# Patient Record
Sex: FEMALE | Race: BLACK OR AFRICAN AMERICAN | Employment: OTHER | URBAN - METROPOLITAN AREA
[De-identification: names, ages, dates, MRNs, and addresses within clinical notes are randomized per-mention and may not be internally consistent; named-entity substitution may affect disease eponyms.]

---

## 2017-09-26 ENCOUNTER — OFFICE VISIT (OUTPATIENT)
Dept: FAMILY MEDICINE CLINIC | Facility: CLINIC | Age: 82
End: 2017-09-26

## 2017-09-26 VITALS
WEIGHT: 173 LBS | OXYGEN SATURATION: 98 % | BODY MASS INDEX: 37.32 KG/M2 | HEIGHT: 57 IN | RESPIRATION RATE: 16 BRPM | HEART RATE: 56 BPM | SYSTOLIC BLOOD PRESSURE: 132 MMHG | DIASTOLIC BLOOD PRESSURE: 50 MMHG | TEMPERATURE: 97.5 F

## 2017-09-26 DIAGNOSIS — I10 HTN, GOAL BELOW 150/90: Primary | ICD-10-CM

## 2017-09-26 DIAGNOSIS — F03.90 DEMENTIA WITHOUT BEHAVIORAL DISTURBANCE, UNSPECIFIED DEMENTIA TYPE: ICD-10-CM

## 2017-09-26 DIAGNOSIS — H25.9 AGE-RELATED CATARACT, UNSPECIFIED AGE-RELATED CATARACT TYPE, UNSPECIFIED LATERALITY: ICD-10-CM

## 2017-09-26 DIAGNOSIS — L60.3 DYSTROPHIC NAIL: ICD-10-CM

## 2017-09-26 DIAGNOSIS — Z23 ENCOUNTER FOR IMMUNIZATION: ICD-10-CM

## 2017-09-26 DIAGNOSIS — H91.93 BILATERAL HEARING LOSS, UNSPECIFIED HEARING LOSS TYPE: ICD-10-CM

## 2017-09-26 RX ORDER — VALSARTAN AND HYDROCHLOROTHIAZIDE 160; 25 MG/1; MG/1
1 TABLET ORAL DAILY
Qty: 90 TAB | Refills: 3 | Status: SHIPPED | OUTPATIENT
Start: 2017-09-26 | End: 2018-08-28 | Stop reason: ALTCHOICE

## 2017-09-26 RX ORDER — VALSARTAN 160 MG/1
160 TABLET ORAL DAILY
COMMUNITY
Start: 2016-08-01 | End: 2017-09-26 | Stop reason: ALTCHOICE

## 2017-09-26 RX ORDER — PREDNISOLONE ACETATE 10 MG/ML
SUSPENSION/ DROPS OPHTHALMIC DAILY
COMMUNITY
End: 2018-02-16

## 2017-09-26 RX ORDER — HYDROCHLOROTHIAZIDE 25 MG/1
25 TABLET ORAL DAILY
COMMUNITY
End: 2017-09-26 | Stop reason: ALTCHOICE

## 2017-09-26 NOTE — PATIENT INSTRUCTIONS
Vaccine Information Statement    Influenza (Flu) Vaccine (Inactivated or Recombinant): What you need to know    Many Vaccine Information Statements are available in Kyrgyz and other languages. See www.immunize.org/vis  Hojas de Información Sobre Vacunas están disponibles en Español y en muchos otros idiomas. Visite www.immunize.org/vis    1. Why get vaccinated? Influenza (flu) is a contagious disease that spreads around the [place] every year, usually between October and May. Flu is caused by influenza viruses, and is spread mainly by coughing, sneezing, and close contact. Anyone can get flu. Flu strikes suddenly and can last several days. Symptoms vary by age, but can include:   fever/chills   sore throat   muscle aches   fatigue   cough   headache    runny or stuffy nose    Flu can also lead to pneumonia and blood infections, and cause diarrhea and seizures in children. If you have a medical condition, such as heart or lung disease, flu can make it worse. Flu is more dangerous for some people. Infants and young children, people 72years of age and older, pregnant women, and people with certain health conditions or a weakened immune system are at greatest risk. Each year thousands of people in the [facility] die from flu, and many more are hospitalized. Flu vaccine can:   keep you from getting flu,   make flu less severe if you do get it, and   keep you from spreading flu to your family and other people. 2. Inactivated and recombinant flu vaccines    A dose of flu vaccine is recommended every flu season. Children 6 months through 6years of age may need two doses during the same flu season. Everyone else needs only one dose each flu season.        Some inactivated flu vaccines contain a very small amount of a mercury-based preservative called thimerosal. Studies have not shown thimerosal in vaccines to be harmful, but flu vaccines that do not contain thimerosal are available. There is no live flu virus in flu shots. They cannot cause the flu. There are many flu viruses, and they are always changing. Each year a new flu vaccine is made to protect against three or four viruses that are likely to cause disease in the upcoming flu season. But even when the vaccine doesnt exactly match these viruses, it may still provide some protection    Flu vaccine cannot prevent:   flu that is caused by a virus not covered by the vaccine, or   illnesses that look like flu but are not. It takes about 2 weeks for protection to develop after vaccination, and protection lasts through the flu season. 3. Some people should not get this vaccine    Tell the person who is giving you the vaccine:     If you have any severe, life-threatening allergies. If you ever had a life-threatening allergic reaction after a dose of flu vaccine, or have a severe allergy to any part of this vaccine, you may be advised not to get vaccinated. Most, but not all, types of flu vaccine contain a small amount of egg protein.  If you ever had Guillain-Barré Syndrome (also called GBS). Some people with a history of GBS should not get this vaccine. This should be discussed with your doctor.  If you are not feeling well. It is usually okay to get flu vaccine when you have a mild illness, but you might be asked to come back when you feel better. 4. Risks of a vaccine reaction    With any medicine, including vaccines, there is a chance of reactions. These are usually mild and go away on their own, but serious reactions are also possible. Most people who get a flu shot do not have any problems with it.      Minor problems following a flu shot include:    soreness, redness, or swelling where the shot was given     hoarseness   sore, red or itchy eyes   cough   fever   aches   headache   itching   fatigue  If these problems occur, they usually begin soon after the shot and last 1 or 2 days. More serious problems following a flu shot can include the following:     There may be a small increased risk of Guillain-Barré Syndrome (GBS) after inactivated flu vaccine. This risk has been estimated at 1 or 2 additional cases per million people vaccinated. This is much lower than the risk of severe complications from flu, which can be prevented by flu vaccine.  Young children who get the flu shot along with pneumococcal vaccine (PCV13) and/or DTaP vaccine at the same time might be slightly more likely to have a seizure caused by fever. Ask your doctor for more information. Tell your doctor if a child who is getting flu vaccine has ever had a seizure. Problems that could happen after any injected vaccine:      People sometimes faint after a medical procedure, including vaccination. Sitting or lying down for about 15 minutes can help prevent fainting, and injuries caused by a fall. Tell your doctor if you feel dizzy, or have vision changes or ringing in the ears.  Some people get severe pain in the shoulder and have difficulty moving the arm where a shot was given. This happens very rarely.  Any medication can cause a severe allergic reaction. Such reactions from a vaccine are very rare, estimated at about 1 in a million doses, and would happen within a few minutes to a few hours after the vaccination. As with any medicine, there is a very remote chance of a vaccine causing a serious injury or death. The safety of vaccines is always being monitored. For more information, visit: www.cdc.gov/vaccinesafety/    5. What if there is a serious reaction? What should I look for?  Look for anything that concerns you, such as signs of a severe allergic reaction, very high fever, or unusual behavior.     Signs of a severe allergic reaction can include hives, swelling of the face and throat, difficulty breathing, a fast heartbeat, dizziness, and weakness  usually within a few minutes to a few hours after the vaccination. What should I do?  If you think it is a severe allergic reaction or other emergency that cant wait, call 9-1-1 and get the person to the nearest hospital. Otherwise, call your doctor.  Reactions should be reported to the Vaccine Adverse Event Reporting System (VAERS). Your doctor should file this report, or you can do it yourself through  the VAERS web site at www.vaers. [facility].gov, or by calling 0-615.411.8102. VAERS does not give medical advice. 6. The National Vaccine Injury Compensation Program    The [facility] Vaccine Injury Compensation Program (VICP) is a federal program that was created to compensate people who may have been injured by certain vaccines. Persons who believe they may have been injured by a vaccine can learn about the program and about filing a claim by calling 0-926.478.4610 or visiting the [employer] website at www.[facility].gov/vaccinecompensation. There is a time limit to file a claim for compensation. 7. How can I learn more?  Ask your healthcare provider. He or she can give you the vaccine package insert or suggest other sources of information.  Call your local or state health department.  Contact the Centers for Disease Control and Prevention (CDC):  - Call 9-248.884.3919 (1-800-CDC-INFO) or  - Visit CDCs website at www.cdc.gov/flu    Vaccine Information Statement   Inactivated Influenza Vaccine   8/7/2015  42 RADHA Negrete 488VJ-05    Department of Health and Human Services  Centers for Disease Control and Prevention    Office Use Only

## 2017-09-26 NOTE — PROGRESS NOTES
HISTORY OF PRESENT ILLNESS  Brody Pedersen is a 80 y.o. female. HPI Comments: Presents to establish care for HTN, hyperlipidemia. No problems with medications. She hasn't had a Wellness visit, but did have at least one PCV shot. She is is in need of a local podiatrist (ney) and ophthalmologist (has moved to [place] from [place] lately). Her daughter has medical power of . She has been diagnosed with dementia, and has been tried on several medications for this without improvement - no behavioral issues. She had left cataract surgery about a year ago, and is still on steroid drops due to inflammation. She will get a flu shot today. Establish Care   Pertinent negatives include no chest pain, no headaches and no shortness of breath. Hypertension    Pertinent negatives include no chest pain, no palpitations, no blurred vision, no headaches, no neck pain, no dizziness, no shortness of breath, no nausea and no vomiting. Past Medical History:   Diagnosis Date    Hypercholesterolemia     Hypertension        Past Surgical History:   Procedure Laterality Date    HX CATARACT REMOVAL Left     HX  SECTION         History   Smoking Status    Never Smoker   Smokeless Tobacco    Never Used     Current Outpatient Prescriptions   Medication Sig    valsartan (DIOVAN) 160 mg tablet Take 160 mg by mouth daily.  hydroCHLOROthiazide (HYDRODIURIL) 25 mg tablet Take 25 mg by mouth daily. Indications: take one-half tablet by mouth oncedily    prednisoLONE acetate (PRED FORTE) 1 % ophthalmic suspension Administer  to left eye daily. No current facility-administered medications for this visit. Review of Systems   Constitutional: Negative for chills, fever and weight loss. HENT: Positive for hearing loss. Negative for congestion and sore throat. Eyes: Negative for blurred vision and double vision. Respiratory: Negative for shortness of breath. Cough: sometimes. Cardiovascular: Negative for chest pain, palpitations and leg swelling. Gastrointestinal: Negative for heartburn, nausea and vomiting. Genitourinary: Negative for dysuria, frequency and urgency. Musculoskeletal: Negative for back pain and neck pain. Skin: Positive for itching (scalp, with some tenderness). Negative for rash. Neurological: Negative for dizziness, tingling, focal weakness and headaches. Endo/Heme/Allergies: Negative for environmental allergies. Psychiatric/Behavioral: Positive for memory loss. Negative for depression. The patient is not nervous/anxious and does not have insomnia. Visit Vitals    /50    Pulse (!) 56    Temp 97.5 °F (36.4 °C)    Resp 16    Ht 4' 8.5\" (1.435 m)    Wt 173 lb (78.5 kg)    SpO2 98%    BMI 38.1 kg/m2       Physical Exam   Constitutional: She appears well-developed and well-nourished. No distress. HENT:   Right Ear: Tympanic membrane, external ear and ear canal normal.   Left Ear: Tympanic membrane, external ear and ear canal normal.   Nose: Nose normal.   Mouth/Throat: Oropharynx is clear and moist.   Eyes: Conjunctivae and EOM are normal. Pupils are equal, round, and reactive to light. Neck: Neck supple. Carotid bruit is not present. No thyromegaly present. Cardiovascular: Normal rate, regular rhythm and intact distal pulses. Exam reveals no gallop and no friction rub. No murmur heard. Pulmonary/Chest: Effort normal and breath sounds normal. No respiratory distress. Abdominal: Soft. She exhibits no mass. There is no tenderness. Musculoskeletal: She exhibits edema (1+ to knees). Lymphadenopathy:     She has no cervical adenopathy. Neurological: She is alert. No cranial nerve deficit. Skin: Skin is warm and dry. Psychiatric: She has a normal mood and affect. Her behavior is normal. Judgment and thought content normal.       ASSESSMENT and PLAN    ICD-10-CM ICD-9-CM    1.  HTN, goal below 150/90 I10 401.9 valsartan-hydroCHLOROthiazide (DIOVAN-HCT) 160-25 mg per tablet   2. Dystrophic nail L60.3 703.8 REFERRAL TO PODIATRY   3. Bilateral hearing loss, unspecified hearing loss type H91.93 389.9    4. Dementia without behavioral disturbance, unspecified dementia type F03.90 294.20    5. Age-related cataract, unspecified age-related cataract type, unspecified laterality H25.9 366.10 REFERRAL TO OPHTHALMOLOGY   6. Encounter for immunization Z23 V03.89 INFLUENZA VIRUS VACCINE, HIGH DOSE SEASONAL, PRESERVATIVE FREE      ADMIN INFLUENZA VIRUS VAC     Follow-up Disposition:  Return in about 3 months (around 12/26/2017). the following changes in treatment are made: Will combine her medication into one tablet. Referrals to Ophth and Podiatry. Will request records from her previous doctors before proceeding with a wellness visit (especially regarding immunizations). Flu shot today. reviewed medications and side effects in detail  Daughter encouraged to bring in her advance directive. Plan of care reviewed - patient and daughter verbalize(s) understanding and agreement.

## 2018-01-19 ENCOUNTER — OFFICE VISIT (OUTPATIENT)
Dept: FAMILY MEDICINE CLINIC | Facility: CLINIC | Age: 83
End: 2018-01-19

## 2018-01-19 VITALS
HEIGHT: 57 IN | TEMPERATURE: 97.4 F | HEART RATE: 56 BPM | WEIGHT: 169 LBS | DIASTOLIC BLOOD PRESSURE: 71 MMHG | OXYGEN SATURATION: 98 % | SYSTOLIC BLOOD PRESSURE: 178 MMHG | BODY MASS INDEX: 36.46 KG/M2 | RESPIRATION RATE: 12 BRPM

## 2018-01-19 DIAGNOSIS — F02.818 LATE ONSET ALZHEIMER'S DISEASE WITH BEHAVIORAL DISTURBANCE (HCC): Primary | ICD-10-CM

## 2018-01-19 DIAGNOSIS — R41.0 CONFUSION: ICD-10-CM

## 2018-01-19 DIAGNOSIS — G30.1 LATE ONSET ALZHEIMER'S DISEASE WITH BEHAVIORAL DISTURBANCE (HCC): Primary | ICD-10-CM

## 2018-01-19 LAB
BILIRUB UR QL STRIP: NEGATIVE
GLUCOSE UR-MCNC: NEGATIVE MG/DL
KETONES P FAST UR STRIP-MCNC: NEGATIVE MG/DL
PH UR STRIP: 5.5 [PH] (ref 4.6–8)
PROT UR QL STRIP: NEGATIVE
SP GR UR STRIP: 1.01 (ref 1–1.03)
UA UROBILINOGEN AMB POC: NORMAL (ref 0.2–1)
URINALYSIS CLARITY POC: CLEAR
URINALYSIS COLOR POC: YELLOW
URINE BLOOD POC: NEGATIVE
URINE LEUKOCYTES POC: NORMAL
URINE NITRITES POC: NEGATIVE

## 2018-01-19 NOTE — PROGRESS NOTES
HISTORY OF PRESENT ILLNESS  Jacek Weathers is a 80 y.o. female. HPI Comments: Presents with concerns about increasing difficulty in managing her care at home. Her family is having trouble getting her to take medications regularly, allow for personal hygiene assistance. She is more belligerent in these situations than before. No violence, fever, incontinence noted. The family wondered if a bladder infection could contribute to this (but no particular symptoms). Bladder Infection    Pertinent negatives include no chills, no nausea, no vomiting and no frequency. Review of Systems   Constitutional: Negative for chills and fever. Respiratory: Negative for shortness of breath. Cardiovascular: Negative for chest pain. Gastrointestinal: Negative for nausea and vomiting. Genitourinary: Negative for dysuria and frequency. Psychiatric/Behavioral: Positive for memory loss. Negative for hallucinations. Some delusions noted       Physical Exam   Constitutional: She appears well-developed and well-nourished. No distress. Neck: Neck supple. No thyromegaly present. Cardiovascular: Normal rate and regular rhythm. Exam reveals no gallop and no friction rub. No murmur heard. Pulmonary/Chest: Effort normal and breath sounds normal. No respiratory distress. Lymphadenopathy:     She has no cervical adenopathy. Neurological: She is alert. Skin: Skin is warm and dry. Psychiatric: She has a normal mood and affect. Her behavior is normal. Judgment and thought content normal.       ASSESSMENT and PLAN    ICD-10-CM ICD-9-CM    1. Late onset Alzheimer's disease with behavioral disturbance G30.1 331.0 REFERRAL TO HOME HEALTH    F02.81 294.11    2. Confusion R41.0 298.9 AMB POC URINALYSIS DIP STICK AUTO W/O MICRO     Follow-up Disposition:  Return in about 4 weeks (around 2/16/2018).   the following changes in treatment are made: Referral to [address] [place] [address] Juan Francisco Felton regarding level of services and availability of services  lab results and schedule of future lab studies reviewed with patient  Will scan her Medical Power of  into the record. Plan of care reviewed - daughter verbalize(s) understanding and agreement.

## 2018-01-19 NOTE — MR AVS SNAPSHOT
[facility] 
 
 
 [address] [address] 
[phone] Patient: Uri Mosley MRN: [id number] FJN:[birth year] Visit Information Date & Time Provider Department Dept. Phone Encounter #  
 1/19/2018 11:30 AM Kevyn Perez MD [employer] [phone] [id number] Follow-up Instructions Return in about 4 weeks (around 2/16/2018). Upcoming Health Maintenance Date Due DTaP/Tdap/Td series (1 - Tdap) 11/5/1944 ZOSTER VACCINE AGE 60> 9/5/1983 OSTEOPOROSIS SCREENING (DEXA) 11/5/1988 Pneumococcal 65+ Low/Medium Risk (1 of 2 - PCV13) 11/5/1988 MEDICARE YEARLY EXAM 11/5/1988 GLAUCOMA SCREENING Q2Y 12/6/2019 Allergies as of 1/19/2018  Review Complete On: 1/19/2018 By: Kevyn Perez MD  
  
 Severity Noted Reaction Type Reactions Pcn [Penicillins]  09/26/2017    Unable to Obtain Current Immunizations  Never Reviewed Name Date Influenza High Dose Vaccine PF 9/26/2017 Not reviewed this visit You Were Diagnosed With   
  
 Codes Comments Late onset Alzheimer's disease with behavioral disturbance    -  Primary ICD-10-CM: G30.1, F02.81 ICD-9-CM: 331.0, 294.11 Confusion     ICD-10-CM: R41.0 ICD-9-CM: 298.9 Vitals BP Pulse Temp Resp Height(growth percentile) Weight(growth percentile) 178/71 (!) 56 97.4 °F (36.3 °C) 12 4' 8.5\" (1.435 m) 169 lb (76.7 kg) SpO2 BMI OB Status Smoking Status 98% 37.22 kg/m2 Postmenopausal Never Smoker Vitals History BMI and BSA Data Body Mass Index Body Surface Area  
 37.22 kg/m 2 1.75 m 2 Preferred Pharmacy Pharmacy Name Phone [address] [phone] Your Updated Medication List  
  
   
This list is accurate as of: 1/19/18 12:34 PM.  Always use your most recent med list.  
  
  
  
  
 prednisoLONE acetate 1 % ophthalmic suspension Commonly known as:  PRED FORTE Administer  to left eye daily. valsartan-hydroCHLOROthiazide 160-25 mg per tablet Commonly known as:  DIOVAN-HCT Take 1 Tab by mouth daily. Indications: hypertension We Performed the Following AMB POC URINALYSIS DIP STICK AUTO W/O MICRO [25655 CPT(R)] [address] [place] [address] Comments:  
 [phone] Please do home health evaluation for dementia - level of services required. She is home-bound (significantly difficult to get her to leave her home). Follow-up Instructions Return in about 4 weeks (around 2/16/2018). Referral Information Referral ID Referred By Referred To  
  
 [id number] Juve OLSON Not Available Visits Status Start Date End Date 1 New Request 1/19/18 1/19/19 If your referral has a status of pending review or denied, additional information will be sent to support the outcome of this decision. Introducing [facility] & HEALTH SERVICES! Rodrigo Patel introduces [employer] patient portal. Now you can access parts of your medical record, email your doctor's office, and request medication refills online. 1. In your internet browser, go to [email or web address] 2. Click on the First Time User? Click Here link in the Sign In box. You will see the New Member Sign Up page. 3. Enter your [employer] Access Code exactly as it appears below. You will not need to use this code after youve completed the sign-up process. If you do not sign up before the expiration date, you must request a new code. · [employer] Access Code: [email or web address] Expires: 4/19/2018 12:34 PM 
 
4. Enter the last four digits of your Social Security Number (xxxx) and Date of Birth (mm/dd/yyyy) as indicated and click Submit. You will be taken to the next sign-up page. 5. Create a Pylba ID.  This will be your Pylba login ID and cannot be changed, so think of one that is secure and easy to remember. 6. Create a [employer] password. You can change your password at any time. 7. Enter your Password Reset Question and Answer. This can be used at a later time if you forget your password. 8. Enter your e-mail address. You will receive e-mail notification when new information is available in [address] 9. Click Sign Up. You can now view and download portions of your medical record. 10. Click the Download Summary menu link to download a portable copy of your medical information. If you have questions, please visit the Frequently Asked Questions section of the [employer] website. Remember, [employer] is NOT to be used for urgent needs. For medical emergencies, dial 911. Now available from your iPhone and Android! Please provide this summary of care documentation to your next provider. Your primary care clinician is listed as Faraz Fam. If you have any questions after today's visit, please call [phone].

## 2018-02-16 ENCOUNTER — OFFICE VISIT (OUTPATIENT)
Dept: FAMILY MEDICINE CLINIC | Facility: CLINIC | Age: 83
End: 2018-02-16

## 2018-02-16 VITALS
HEART RATE: 55 BPM | RESPIRATION RATE: 18 BRPM | OXYGEN SATURATION: 98 % | TEMPERATURE: 97.9 F | BODY MASS INDEX: 37.07 KG/M2 | WEIGHT: 164.8 LBS | SYSTOLIC BLOOD PRESSURE: 137 MMHG | HEIGHT: 56 IN | DIASTOLIC BLOOD PRESSURE: 55 MMHG

## 2018-02-16 DIAGNOSIS — I10 HTN, GOAL BELOW 150/90: Primary | ICD-10-CM

## 2018-02-16 DIAGNOSIS — F02.818 LATE ONSET ALZHEIMER'S DISEASE WITH BEHAVIORAL DISTURBANCE (HCC): ICD-10-CM

## 2018-02-16 DIAGNOSIS — Z22.7 TB LUNG, LATENT: ICD-10-CM

## 2018-02-16 DIAGNOSIS — G30.1 LATE ONSET ALZHEIMER'S DISEASE WITH BEHAVIORAL DISTURBANCE (HCC): ICD-10-CM

## 2018-02-16 DIAGNOSIS — E78.5 HYPERLIPIDEMIA, UNSPECIFIED HYPERLIPIDEMIA TYPE: ICD-10-CM

## 2018-02-16 RX ORDER — KETOROLAC TROMETHAMINE 4 MG/ML
1 SOLUTION/ DROPS OPHTHALMIC 4 TIMES DAILY
COMMUNITY
End: 2018-04-25 | Stop reason: SDDI

## 2018-02-16 NOTE — PROGRESS NOTES
Abel Dominguez is a  80 y.o. female presents today for office visit for routine follow up. 1. Have you been to the ER, urgent care clinic or hospitalized since your last visit? NO    2. Have you seen or consulted any other health care providers outside of the [address] [place] [address] since your last visit (Include any pap smears or colon screening)?  NO

## 2018-02-16 NOTE — MR AVS SNAPSHOT
[address] [address] 
[phone] Patient: Uri Mosley MRN: [id number] OPC:[birth year] Visit Information Date & Time Provider Department Dept. Phone Encounter #  
 2/16/2018  1:30 PM Kevyn Perez MD [facility] [phone] [id number] Follow-up Instructions Return in about 3 months (around 5/16/2018). Upcoming Health Maintenance Date Due DTaP/Tdap/Td series (1 - Tdap) 11/5/1944 ZOSTER VACCINE AGE 60> 9/5/1983 OSTEOPOROSIS SCREENING (DEXA) 11/5/1988 Pneumococcal 65+ Low/Medium Risk (1 of 2 - PCV13) 11/5/1988 MEDICARE YEARLY EXAM 11/5/1988 GLAUCOMA SCREENING Q2Y 12/6/2019 Allergies as of 2/16/2018  Review Complete On: 2/16/2018 By: Kevyn Perez MD  
  
 Severity Noted Reaction Type Reactions Aspirin  07/26/2016    Itching Pcn [Penicillins]  09/26/2017    Unable to Obtain Current Immunizations  Never Reviewed Name Date Influenza High Dose Vaccine PF 9/26/2017 Not reviewed this visit You Were Diagnosed With   
  
 Codes Comments HTN, goal below 150/90    -  Primary ICD-10-CM: I10 
ICD-9-CM: 401.9 Hyperlipidemia, unspecified hyperlipidemia type     ICD-10-CM: E78.5 ICD-9-CM: 272.4 Late onset Alzheimer's disease with behavioral disturbance     ICD-10-CM: G30.1, F02.81 ICD-9-CM: 331.0, 294.11   
 TB lung, latent     ICD-10-CM: R76.11 
ICD-9-CM: 795.51 Vitals BP Pulse Temp Resp Height(growth percentile) Weight(growth percentile) 137/55 (BP 1 Location: Right arm, BP Patient Position: Sitting) (!) 55 97.9 °F (36.6 °C) (Oral) 18 4' 8\" (1.422 m) 164 lb 12.8 oz (74.8 kg) SpO2 BMI OB Status Smoking Status 98% 36.95 kg/m2 Postmenopausal Never Smoker Vitals History BMI and BSA Data Body Mass Index Body Surface Area  
 36.95 kg/m 2 1.72 m 2 Preferred Pharmacy Pharmacy Name Phone [address], [address] [place] [address] [phone] Your Updated Medication List  
  
   
This list is accurate as of: 2/16/18  2:24 PM.  Always use your most recent med list.  
  
  
  
  
 ketorolac 0.4 % Drop Commonly known as:  ACULAR LS Administer 1 Drop to both eyes four (4) times daily. valsartan-hydroCHLOROthiazide 160-25 mg per tablet Commonly known as:  DIOVAN-HCT Take 1 Tab by mouth daily. Indications: hypertension Follow-up Instructions Return in about 3 months (around 5/16/2018). Introducing \[facility]\"" & HEALTH SERVICES! Armando Cheng introduces [employer] patient portal. Now you can access parts of your medical record, email your doctor's office, and request medication refills online. 1. In your internet browser, go to [email or web address] 2. Click on the First Time User? Click Here link in the Sign In box. You will see the New Member Sign Up page. 3. Enter your [employer] Access Code exactly as it appears below. You will not need to use this code after youve completed the sign-up process. If you do not sign up before the expiration date, you must request a new code. · [employer] Access Code: [email or web address] Expires: 4/19/2018 12:34 PM 
 
4. Enter the last four digits of your Social Security Number (xxxx) and Date of Birth (mm/dd/yyyy) as indicated and click Submit. You will be taken to the next sign-up page. 5. Create a [employer] ID. This will be your [employer] login ID and cannot be changed, so think of one that is secure and easy to remember. 6. Create a [employer] password. You can change your password at any time. 7. Enter your Password Reset Question and Answer. This can be used at a later time if you forget your password. 8. Enter your e-mail address. You will receive e-mail notification when new information is available in [address] 9. Click Sign Up. You can now view and download portions of your medical record. 10. Click the Download Summary menu link to download a portable copy of your medical information. If you have questions, please visit the Frequently Asked Questions section of the [employer] website. Remember, [employer] is NOT to be used for urgent needs. For medical emergencies, dial 911. Now available from your iPhone and Android! Please provide this summary of care documentation to your next provider. Your primary care clinician is listed as Melia Munoz. If you have any questions after today's visit, please call [phone].

## 2018-02-16 NOTE — PROGRESS NOTES
HISTORY OF PRESENT ILLNESS  Uri Mosley is a 80 y.o. female. HPI Comments: Seen in follow-up for HTN, hyperlipidemia, dementia. No problems with medications. Her daughter never heard anything from [facility]. No real changes in her situation (see my last note). Her daughter asks about the procedure to be followed to get her a CXR (previous positive TB testing) when the time comes. Dementia    Pertinent negatives include no tingling. Past Medical History:   Diagnosis Date    Hypercholesterolemia     Hypertension        Past Surgical History:   Procedure Laterality Date    HX CATARACT REMOVAL Left     HX  SECTION         History   Smoking Status    Never Smoker   Smokeless Tobacco    Never Used     . medte    Review of Systems   Constitutional: Negative for chills and fever. Respiratory: Negative for shortness of breath. Cardiovascular: Negative for chest pain, palpitations and leg swelling. Gastrointestinal: Negative for nausea and vomiting. Neurological: Negative for tingling, focal weakness and headaches. Psychiatric/Behavioral: The patient does not have insomnia. Visit Vitals    /55 (BP 1 Location: Right arm, BP Patient Position: Sitting)    Pulse (!) 55    Temp 97.9 °F (36.6 °C) (Oral)    Resp 18    Ht 4' 8\" (1.422 m)    Wt 164 lb 12.8 oz (74.8 kg)    SpO2 98%    BMI 36.95 kg/m2       Physical Exam   Constitutional: She is oriented to person, place, and time. She appears well-developed and well-nourished. No distress. Neck: Neck supple. No thyromegaly present. Carotid bruit absent   Cardiovascular: Normal rate, regular rhythm and intact distal pulses. Exam reveals no gallop and no friction rub. No murmur heard. Pulmonary/Chest: Effort normal and breath sounds normal. No respiratory distress. Musculoskeletal: She exhibits no edema. Lymphadenopathy:     She has no cervical adenopathy.    Neurological: She is alert and oriented to person, place, and time. Skin: Skin is warm and dry. Psychiatric: She has a normal mood and affect. Her behavior is normal. Judgment and thought content normal.   Nursing note and vitals reviewed. ASSESSMENT and PLAN    ICD-10-CM ICD-9-CM    1. HTN, goal below 150/90 I10 401.9    2. Hyperlipidemia, unspecified hyperlipidemia type E78.5 272.4    3. Late onset Alzheimer's disease with behavioral disturbance G30.1 331.0     F02.81 294.11    4. TB lung, latent R76.11 795.51      Follow-up Disposition:  Return in about 3 months (around 5/16/2018). current treatment plan is effective, no change in therapy  Will investigate why Home Health has not made any connection with this family. Plan of care reviewed - patient verbalize(s) understanding and agreement.

## 2018-04-25 ENCOUNTER — HOME HEALTH ADMISSION (OUTPATIENT)
Dept: HOME HEALTH SERVICES | Facility: HOME HEALTH | Age: 83
End: 2018-04-25
Payer: MEDICARE

## 2018-04-25 ENCOUNTER — OFFICE VISIT (OUTPATIENT)
Dept: FAMILY MEDICINE CLINIC | Facility: CLINIC | Age: 83
End: 2018-04-25

## 2018-04-25 VITALS
HEIGHT: 56 IN | RESPIRATION RATE: 14 BRPM | DIASTOLIC BLOOD PRESSURE: 53 MMHG | SYSTOLIC BLOOD PRESSURE: 125 MMHG | HEART RATE: 60 BPM | WEIGHT: 168 LBS | TEMPERATURE: 98 F | OXYGEN SATURATION: 99 % | BODY MASS INDEX: 37.79 KG/M2

## 2018-04-25 DIAGNOSIS — F02.818 LATE ONSET ALZHEIMER'S DISEASE WITH BEHAVIORAL DISTURBANCE (HCC): Primary | ICD-10-CM

## 2018-04-25 DIAGNOSIS — B02.29 POST HERPETIC NEURALGIA: ICD-10-CM

## 2018-04-25 DIAGNOSIS — G30.1 LATE ONSET ALZHEIMER'S DISEASE WITH BEHAVIORAL DISTURBANCE (HCC): Primary | ICD-10-CM

## 2018-04-25 DIAGNOSIS — M15.9 PRIMARY OSTEOARTHRITIS INVOLVING MULTIPLE JOINTS: ICD-10-CM

## 2018-04-25 PROBLEM — E66.01 SEVERE OBESITY (BMI 35.0-39.9) WITH COMORBIDITY (HCC): Status: ACTIVE | Noted: 2018-04-25

## 2018-04-25 NOTE — PROGRESS NOTES
1. Have you been to the ER, urgent care clinic since your last visit? Hospitalized since your last visit? No    2. Have you seen or consulted any other health care providers outside of the [address] [place] [address] since your last visit? Include any pap smears or colon screening.  No

## 2018-04-25 NOTE — MR AVS SNAPSHOT
[facility] 
 
 
 [address] [facility] [address] [place] [address] 
[phone] Patient: Liya Melgar MRN: [id number] SOP:29/7/4478 Visit Information Date & Time Provider Department Dept. Phone Encounter #  
 4/25/2018  2:45 PM Omari Lopez MD [facility] [id number] Follow-up Instructions Return as previously planned. Your Appointments 5/16/2018  1:30 PM  
ROUTINE CARE with Omari Lopez MD  
[facility] ([address] [place] [address]) Appt Note: 3 month follow up appt. [address] [facility] [address] [facility] [address]  
[phone]  
  
   
 [address] [address] [facility] [place] Upcoming Health Maintenance Date Due DTaP/Tdap/Td series (1 - Tdap) 11/5/1944 ZOSTER VACCINE AGE 60> 9/5/1983 Bone Densitometry (Dexa) Screening 11/5/1988 Pneumococcal 65+ Low/Medium Risk (1 of 2 - PCV13) 11/5/1988 GLAUCOMA SCREENING Q2Y 12/6/2019 Allergies as of 4/25/2018  Review Complete On: 4/25/2018 By: Omari Lopez MD  
  
 Severity Noted Reaction Type Reactions Aspirin  07/26/2016    Itching Pcn [Penicillins]  09/26/2017    Unable to Obtain Current Immunizations  Never Reviewed Name Date Influenza High Dose Vaccine PF 9/26/2017 Not reviewed this visit You Were Diagnosed With   
  
 Codes Comments Late onset Alzheimer's disease with behavioral disturbance    -  Primary ICD-10-CM: G30.1, F02.81 ICD-9-CM: 331.0, 294.11 Post herpetic neuralgia     ICD-10-CM: B02.29 ICD-9-CM: 053.19 Primary osteoarthritis involving multiple joints     ICD-10-CM: M15.0 ICD-9-CM: 715.09 Vitals BP Pulse Temp Resp Height(growth percentile) Weight(growth percentile) 125/53 60 98 °F (36.7 °C) 14 4' 8\" (1.422 m) 168 lb (76.2 kg) SpO2 BMI OB Status Smoking Status 99% 37.66 kg/m2 Postmenopausal Never Smoker Vitals History BMI and BSA Data Body Mass Index Body Surface Area  
 37.66 kg/m 2 1.74 m 2 Preferred Pharmacy Pharmacy Name Phone [address] [place] [address], [address] [place] [address] Rd [phone] Your Updated Medication List  
  
   
This list is accurate as of 4/25/18  3:42 PM.  Always use your most recent med list.  
  
  
  
  
 ILEVRO 0.3 % Drps Generic drug:  nepafenac Apply  to eye. Indications: 1 drop left eye daily  
  
 valsartan-hydroCHLOROthiazide 160-25 mg per tablet Commonly known as:  DIOVAN-HCT Take 1 Tab by mouth daily. Indications: hypertension We Performed the Following [address] [place] [address] Comments:  
 Please see for ST for dementia, SW for available resources, PT/OT - she is home-bound Follow-up Instructions Return as previously planned. Referral Information Referral ID Referred By Referred To  
  
 [id number] [facility], [address], [address] Phone: [phone] Fax: [phone] Visits Status Start Date End Date 1 New Request 4/25/18 4/25/19 If your referral has a status of pending review or denied, additional information will be sent to support the outcome of this decision. Introducing [facility]! [facility] introduces [email or web address] patient portal. Now you can access parts of your medical record, email your doctor's office, and request medication refills online. 1. In your internet browser, go to [email or web address] 2. Click on the First Time User? Click Here link in the Sign In box. You will see the New Member Sign Up page. 3. Enter your [email or web address] Access Code exactly as it appears below. You will not need to use this code after youve completed the sign-up process. If you do not sign up before the expiration date, you must request a new code. · [employer] Access Code: [email or web address] Expires: 7/24/2018  3:42 PM 
 
4. Enter the last four digits of your Social Security Number (xxxx) and Date of Birth (mm/dd/yyyy) as indicated and click Submit. You will be taken to the next sign-up page. 5. Create a [employer] ID. This will be your [employer] login ID and cannot be changed, so think of one that is secure and easy to remember. 6. Create a [employer] password. You can change your password at any time. 7. Enter your Password Reset Question and Answer. This can be used at a later time if you forget your password. 8. Enter your e-mail address. You will receive e-mail notification when new information is available in [address] 9. Click Sign Up. You can now view and download portions of your medical record. 10. Click the Download Summary menu link to download a portable copy of your medical information. If you have questions, please visit the Frequently Asked Questions section of the [employer] website. Remember, [employer] is NOT to be used for urgent needs. For medical emergencies, dial 911. Now available from your iPhone and Android! Please provide this summary of care documentation to your next provider. Your primary care clinician is listed as Ev Prasad. If you have any questions after today's visit, please call [phone].

## 2018-04-25 NOTE — PROGRESS NOTES
HISTORY OF PRESENT ILLNESS  Oneal Bowman is a 80 y.o. female. HPI Comments: Presents with several issues. She still needs a home health evaluation for her dementia and available services for her and her family. Her family has noticed that the skin on her hands and face is darker over the past 3 weeks. She dose sit in the sunshine, and she doesn't complaint of itching or pain. However, she does complain of tenderness of her scalp over the past year. She has some frontal hair loss, but she hasn't had any skin lesions or alopecia. She did have shingles several years ago on the scalp. Also complains of left shoulder pain and difficulty in raising her arm, for the past month or so. No known trauma. Tylenol does seem to help. Skin Problem   Pertinent negatives include no chest pain and no shortness of breath. Hair/Scalp Problem   Pertinent negatives include no chest pain and no shortness of breath. Shoulder Pain      Altered mental status        Past Medical History:   Diagnosis Date    Hypercholesterolemia     Hypertension        Past Surgical History:   Procedure Laterality Date    HX CATARACT REMOVAL Left     HX  SECTION         History   Smoking Status    Never Smoker   Smokeless Tobacco    Never Used     Current Outpatient Prescriptions   Medication Sig    nepafenac (ILEVRO) 0.3 % drps Apply  to eye. Indications: 1 drop left eye daily    valsartan-hydroCHLOROthiazide (DIOVAN-HCT) 160-25 mg per tablet Take 1 Tab by mouth daily. Indications: hypertension     No current facility-administered medications for this visit. Review of Systems   Constitutional: Negative for chills and fever. Respiratory: Negative for shortness of breath. Cardiovascular: Negative for chest pain. Gastrointestinal: Negative for nausea and vomiting. Musculoskeletal: Positive for joint pain. Skin: Positive for rash. Negative for itching. Psychiatric/Behavioral: Positive for memory loss.      Visit Vitals    /53    Pulse 60    Temp 98 °F (36.7 °C)    Resp 14    Ht 4' 8\" (1.422 m)    Wt 168 lb (76.2 kg)    SpO2 99%    BMI 37.66 kg/m2       Physical Exam   Constitutional: She appears well-developed and well-nourished. No distress. Neck: Neck supple. No thyromegaly present. Cardiovascular: Normal rate and regular rhythm. Exam reveals no gallop and no friction rub. No murmur heard. Pulmonary/Chest: Effort normal and breath sounds normal. No respiratory distress. Musculoskeletal:        Left shoulder: She exhibits tenderness (posteriorly). She exhibits normal range of motion, no bony tenderness and no crepitus. Lymphadenopathy:     She has no cervical adenopathy. Neurological: She is alert. Skin: Skin is warm and dry. Skin on trunk notably less dark that exposed skin. Scalp - few moles, no inflammatory lesions. Psychiatric: She has a normal mood and affect. Her behavior is normal.       ASSESSMENT and PLAN    ICD-10-CM ICD-9-CM    1. Late onset Alzheimer's disease with behavioral disturbance G30.1 331.0 REFERRAL TO HOME HEALTH    F02.81 294.11    2. Post herpetic neuralgia B02.29 053.19    3. Primary osteoarthritis involving multiple joints M15.0 715.09      Follow-up Disposition:  Return as previously planned. the following changes in treatment are made: Referral to  [facility] ([facility], SW, PT, OT). Daughter advised that she can have Tylenol on a scheduled basis (up to 3 times daily) for joint pain. No specific treatment at this point for scalp pain. reviewed medications and side effects in detail  Plan of care reviewed - daughter verbalize(s) understanding and agreement.

## 2018-04-27 ENCOUNTER — HOME CARE VISIT (OUTPATIENT)
Dept: SCHEDULING | Facility: HOME HEALTH | Age: 83
End: 2018-04-27
Payer: MEDICARE

## 2018-04-27 ENCOUNTER — HOME CARE VISIT (OUTPATIENT)
Dept: HOME HEALTH SERVICES | Facility: HOME HEALTH | Age: 83
End: 2018-04-27

## 2018-04-27 VITALS
HEIGHT: 59 IN | DIASTOLIC BLOOD PRESSURE: 66 MMHG | RESPIRATION RATE: 18 BRPM | OXYGEN SATURATION: 97 % | HEART RATE: 60 BPM | TEMPERATURE: 97.5 F | SYSTOLIC BLOOD PRESSURE: 120 MMHG

## 2018-04-27 PROCEDURE — G0151 HHCP-SERV OF PT,EA 15 MIN: HCPCS

## 2018-04-27 PROCEDURE — 3331090002 HH PPS REVENUE DEBIT

## 2018-04-27 PROCEDURE — 400013 HH SOC

## 2018-04-27 PROCEDURE — 3331090001 HH PPS REVENUE CREDIT

## 2018-04-28 PROCEDURE — 3331090001 HH PPS REVENUE CREDIT

## 2018-04-28 PROCEDURE — 3331090002 HH PPS REVENUE DEBIT

## 2018-04-29 PROCEDURE — 3331090002 HH PPS REVENUE DEBIT

## 2018-04-29 PROCEDURE — 3331090001 HH PPS REVENUE CREDIT

## 2018-04-30 ENCOUNTER — HOME CARE VISIT (OUTPATIENT)
Dept: HOME HEALTH SERVICES | Facility: HOME HEALTH | Age: 83
End: 2018-04-30
Payer: MEDICARE

## 2018-04-30 PROCEDURE — 3331090001 HH PPS REVENUE CREDIT

## 2018-04-30 PROCEDURE — 3331090002 HH PPS REVENUE DEBIT

## 2018-05-01 PROCEDURE — 3331090002 HH PPS REVENUE DEBIT

## 2018-05-01 PROCEDURE — 3331090001 HH PPS REVENUE CREDIT

## 2018-05-02 ENCOUNTER — HOME CARE VISIT (OUTPATIENT)
Dept: HOME HEALTH SERVICES | Facility: HOME HEALTH | Age: 83
End: 2018-05-02
Payer: MEDICARE

## 2018-05-02 ENCOUNTER — HOME CARE VISIT (OUTPATIENT)
Dept: SCHEDULING | Facility: HOME HEALTH | Age: 83
End: 2018-05-02
Payer: MEDICARE

## 2018-05-02 VITALS
HEART RATE: 70 BPM | OXYGEN SATURATION: 96 % | DIASTOLIC BLOOD PRESSURE: 81 MMHG | SYSTOLIC BLOOD PRESSURE: 106 MMHG | TEMPERATURE: 98.1 F

## 2018-05-02 VITALS
OXYGEN SATURATION: 96 % | DIASTOLIC BLOOD PRESSURE: 80 MMHG | SYSTOLIC BLOOD PRESSURE: 140 MMHG | TEMPERATURE: 97.5 F | HEART RATE: 68 BPM

## 2018-05-02 PROCEDURE — G0157 HHC PT ASSISTANT EA 15: HCPCS

## 2018-05-02 PROCEDURE — 3331090001 HH PPS REVENUE CREDIT

## 2018-05-02 PROCEDURE — 3331090002 HH PPS REVENUE DEBIT

## 2018-05-02 PROCEDURE — G0152 HHCP-SERV OF OT,EA 15 MIN: HCPCS

## 2018-05-02 PROCEDURE — G0155 HHCP-SVS OF CSW,EA 15 MIN: HCPCS

## 2018-05-03 PROCEDURE — 3331090002 HH PPS REVENUE DEBIT

## 2018-05-03 PROCEDURE — 3331090001 HH PPS REVENUE CREDIT

## 2018-05-04 ENCOUNTER — HOME CARE VISIT (OUTPATIENT)
Dept: SCHEDULING | Facility: HOME HEALTH | Age: 83
End: 2018-05-04
Payer: MEDICARE

## 2018-05-04 VITALS
DIASTOLIC BLOOD PRESSURE: 68 MMHG | HEART RATE: 68 BPM | TEMPERATURE: 97.8 F | SYSTOLIC BLOOD PRESSURE: 120 MMHG | OXYGEN SATURATION: 98 %

## 2018-05-04 PROCEDURE — 3331090001 HH PPS REVENUE CREDIT

## 2018-05-04 PROCEDURE — 3331090002 HH PPS REVENUE DEBIT

## 2018-05-04 PROCEDURE — G0157 HHC PT ASSISTANT EA 15: HCPCS

## 2018-05-05 PROCEDURE — 3331090001 HH PPS REVENUE CREDIT

## 2018-05-05 PROCEDURE — 3331090002 HH PPS REVENUE DEBIT

## 2018-05-06 PROCEDURE — 3331090002 HH PPS REVENUE DEBIT

## 2018-05-06 PROCEDURE — 3331090001 HH PPS REVENUE CREDIT

## 2018-05-07 ENCOUNTER — HOME CARE VISIT (OUTPATIENT)
Dept: SCHEDULING | Facility: HOME HEALTH | Age: 83
End: 2018-05-07
Payer: MEDICARE

## 2018-05-07 PROCEDURE — G0153 HHCP-SVS OF S/L PATH,EA 15MN: HCPCS

## 2018-05-07 PROCEDURE — 3331090001 HH PPS REVENUE CREDIT

## 2018-05-07 PROCEDURE — 3331090002 HH PPS REVENUE DEBIT

## 2018-05-08 ENCOUNTER — HOME CARE VISIT (OUTPATIENT)
Dept: SCHEDULING | Facility: HOME HEALTH | Age: 83
End: 2018-05-08
Payer: MEDICARE

## 2018-05-08 VITALS
DIASTOLIC BLOOD PRESSURE: 70 MMHG | RESPIRATION RATE: 18 BRPM | TEMPERATURE: 96.4 F | HEART RATE: 64 BPM | OXYGEN SATURATION: 97 % | SYSTOLIC BLOOD PRESSURE: 120 MMHG

## 2018-05-08 PROCEDURE — 3331090002 HH PPS REVENUE DEBIT

## 2018-05-08 PROCEDURE — 3331090001 HH PPS REVENUE CREDIT

## 2018-05-09 ENCOUNTER — HOME CARE VISIT (OUTPATIENT)
Dept: SCHEDULING | Facility: HOME HEALTH | Age: 83
End: 2018-05-09
Payer: MEDICARE

## 2018-05-09 VITALS
SYSTOLIC BLOOD PRESSURE: 159 MMHG | OXYGEN SATURATION: 97 % | TEMPERATURE: 98 F | HEART RATE: 58 BPM | DIASTOLIC BLOOD PRESSURE: 64 MMHG

## 2018-05-09 PROCEDURE — 3331090002 HH PPS REVENUE DEBIT

## 2018-05-09 PROCEDURE — 3331090001 HH PPS REVENUE CREDIT

## 2018-05-09 PROCEDURE — G0153 HHCP-SVS OF S/L PATH,EA 15MN: HCPCS

## 2018-05-10 PROCEDURE — 3331090001 HH PPS REVENUE CREDIT

## 2018-05-10 PROCEDURE — 3331090002 HH PPS REVENUE DEBIT

## 2018-05-11 ENCOUNTER — TELEPHONE (OUTPATIENT)
Dept: FAMILY MEDICINE CLINIC | Facility: CLINIC | Age: 83
End: 2018-05-11

## 2018-05-11 DIAGNOSIS — Z11.1 SCREENING-PULMONARY TB: Primary | ICD-10-CM

## 2018-05-11 PROCEDURE — 3331090001 HH PPS REVENUE CREDIT

## 2018-05-11 PROCEDURE — 3331090002 HH PPS REVENUE DEBIT

## 2018-05-11 NOTE — TELEPHONE ENCOUNTER
Patient's daughter called stating that her mother needs a referral for chest x ray with   [facility] number  [phone]. Daughter would like to take patient next Friday on May 18, 2018.

## 2018-05-12 PROCEDURE — 3331090002 HH PPS REVENUE DEBIT

## 2018-05-12 PROCEDURE — 3331090001 HH PPS REVENUE CREDIT

## 2018-05-13 PROCEDURE — 3331090001 HH PPS REVENUE CREDIT

## 2018-05-13 PROCEDURE — 3331090002 HH PPS REVENUE DEBIT

## 2018-05-14 ENCOUNTER — HOME CARE VISIT (OUTPATIENT)
Dept: SCHEDULING | Facility: HOME HEALTH | Age: 83
End: 2018-05-14
Payer: MEDICARE

## 2018-05-14 PROCEDURE — 3331090001 HH PPS REVENUE CREDIT

## 2018-05-14 PROCEDURE — G0153 HHCP-SVS OF S/L PATH,EA 15MN: HCPCS

## 2018-05-14 PROCEDURE — 3331090002 HH PPS REVENUE DEBIT

## 2018-05-15 VITALS
HEART RATE: 66 BPM | TEMPERATURE: 98.4 F | OXYGEN SATURATION: 95 % | DIASTOLIC BLOOD PRESSURE: 67 MMHG | SYSTOLIC BLOOD PRESSURE: 135 MMHG

## 2018-05-15 PROCEDURE — 3331090001 HH PPS REVENUE CREDIT

## 2018-05-15 PROCEDURE — 3331090002 HH PPS REVENUE DEBIT

## 2018-05-16 ENCOUNTER — HOME CARE VISIT (OUTPATIENT)
Dept: SCHEDULING | Facility: HOME HEALTH | Age: 83
End: 2018-05-16
Payer: MEDICARE

## 2018-05-16 PROCEDURE — 3331090001 HH PPS REVENUE CREDIT

## 2018-05-16 PROCEDURE — 3331090002 HH PPS REVENUE DEBIT

## 2018-05-16 PROCEDURE — G0153 HHCP-SVS OF S/L PATH,EA 15MN: HCPCS

## 2018-05-16 PROCEDURE — 3331090003 HH PPS REVENUE ADJ

## 2018-05-17 VITALS
SYSTOLIC BLOOD PRESSURE: 137 MMHG | TEMPERATURE: 98 F | HEART RATE: 71 BPM | OXYGEN SATURATION: 97 % | DIASTOLIC BLOOD PRESSURE: 55 MMHG

## 2018-05-17 PROCEDURE — 3331090001 HH PPS REVENUE CREDIT

## 2018-05-17 PROCEDURE — 3331090002 HH PPS REVENUE DEBIT

## 2018-05-18 DIAGNOSIS — Z11.1 SCREENING-PULMONARY TB: Primary | ICD-10-CM

## 2018-05-18 PROCEDURE — 3331090001 HH PPS REVENUE CREDIT

## 2018-05-18 PROCEDURE — 3331090002 HH PPS REVENUE DEBIT

## 2018-05-19 PROCEDURE — 3331090001 HH PPS REVENUE CREDIT

## 2018-05-19 PROCEDURE — 3331090002 HH PPS REVENUE DEBIT

## 2018-05-20 PROCEDURE — 3331090002 HH PPS REVENUE DEBIT

## 2018-05-20 PROCEDURE — 3331090001 HH PPS REVENUE CREDIT

## 2018-05-21 PROCEDURE — 3331090001 HH PPS REVENUE CREDIT

## 2018-05-21 PROCEDURE — 3331090002 HH PPS REVENUE DEBIT

## 2018-05-22 PROCEDURE — 3331090002 HH PPS REVENUE DEBIT

## 2018-05-22 PROCEDURE — 3331090001 HH PPS REVENUE CREDIT

## 2018-06-13 ENCOUNTER — OFFICE VISIT (OUTPATIENT)
Dept: FAMILY MEDICINE CLINIC | Facility: CLINIC | Age: 83
End: 2018-06-13

## 2018-06-13 VITALS
HEIGHT: 59 IN | SYSTOLIC BLOOD PRESSURE: 140 MMHG | HEART RATE: 63 BPM | RESPIRATION RATE: 18 BRPM | TEMPERATURE: 97.9 F | BODY MASS INDEX: 34.07 KG/M2 | WEIGHT: 169 LBS | OXYGEN SATURATION: 96 % | DIASTOLIC BLOOD PRESSURE: 58 MMHG

## 2018-06-13 DIAGNOSIS — F02.818 LATE ONSET ALZHEIMER'S DISEASE WITH BEHAVIORAL DISTURBANCE (HCC): ICD-10-CM

## 2018-06-13 DIAGNOSIS — G30.1 LATE ONSET ALZHEIMER'S DISEASE WITH BEHAVIORAL DISTURBANCE (HCC): ICD-10-CM

## 2018-06-13 DIAGNOSIS — Z00.00 MEDICARE ANNUAL WELLNESS VISIT, SUBSEQUENT: Primary | ICD-10-CM

## 2018-06-13 DIAGNOSIS — Z13.39 SCREENING FOR ALCOHOLISM: ICD-10-CM

## 2018-06-13 DIAGNOSIS — Z13.31 SCREENING FOR DEPRESSION: ICD-10-CM

## 2018-06-13 PROBLEM — E66.09 CLASS 1 OBESITY DUE TO EXCESS CALORIES WITH SERIOUS COMORBIDITY AND BODY MASS INDEX (BMI) OF 34.0 TO 34.9 IN ADULT: Status: ACTIVE | Noted: 2018-04-25

## 2018-06-13 NOTE — PROGRESS NOTES
HISTORY OF PRESENT ILLNESS  Liya Melgar is a 80 y.o. female. HPI Comments: Presents for evaluation for admission to [facility] care ([facility]) for several days. She has had a CXR for TB screening, but needs a \"physical\" done. It appears that she will need to have a specific form from the facility for documentation. Past Medical History:   Diagnosis Date    Hypercholesterolemia     Hypertension        Past Surgical History:   Procedure Laterality Date    HX CATARACT REMOVAL Left     HX  SECTION         History   Smoking Status    Never Smoker   Smokeless Tobacco    Never Used     Current Outpatient Prescriptions   Medication Sig    nepafenac (ILEVRO) 0.3 % drps Apply 1 Drop to eye daily. Indications: POSTOPERATIVE OCULAR PAIN, 1 drop left eye daily    valsartan-hydroCHLOROthiazide (DIOVAN-HCT) 160-25 mg per tablet Take 1 Tab by mouth daily. Indications: hypertension     No current facility-administered medications for this visit. Review of Systems   Constitutional: Negative for chills and fever. Respiratory: Negative for shortness of breath. Cardiovascular: Negative for chest pain. Gastrointestinal: Negative for nausea and vomiting. Visit Vitals    /58 (BP 1 Location: Right arm, BP Patient Position: Sitting)    Pulse 63    Temp 97.9 °F (36.6 °C) (Oral)    Resp 18    Ht 4' 11\" (1.499 m)    Wt 169 lb (76.7 kg)    SpO2 96%    BMI 34.13 kg/m2       Physical Exam   Constitutional: She appears well-developed and well-nourished. No distress. Neck: Neck supple. No thyromegaly present. Carotid bruit absent   Cardiovascular: Normal rate, regular rhythm and intact distal pulses. Exam reveals no gallop and no friction rub. No murmur heard. Pulmonary/Chest: Effort normal and breath sounds normal. No respiratory distress. Abdominal: Soft. She exhibits no mass. There is tenderness (minimal RLQ tenderness without mass or guarding).    Musculoskeletal: She exhibits no edema. Lymphadenopathy:     She has no cervical adenopathy. Neurological: She is alert. Skin: Skin is warm and dry. Psychiatric: She has a normal mood and affect. Her behavior is normal. Judgment and thought content normal.   Nursing note and vitals reviewed. ASSESSMENT and PLAN    ICD-10-CM ICD-9-CM    1. Late onset Alzheimer's disease with behavioral disturbance G30.1 331.0     F02.81 294.11      Follow-up Disposition:  Return in about 1 year (around 6/13/2019), or if symptoms worsen or fail to improve. current treatment plan is effective, no change in therapy  radiology results and schedule of future radiology studies reviewed with patient - CXR report provided to her daughter. Her daughter will bring in the correct form for [facility], for me to complete on 6/15. Plan of care reviewed - patient and daughter verbalize(s) understanding and agreement.

## 2018-06-13 NOTE — ACP (ADVANCE CARE PLANNING)
Advance Directive:  1. Do you have an advance directive in place? Patient Reply:yes, will get copy to office per Daughter.     2. If not, would you like material regarding how to put one in place?  Patient Reply: no

## 2018-06-13 NOTE — PROGRESS NOTES
Chief Complaint   Patient presents with    Annual Wellness Visit       Pt preferred language for health care discussion is english. Is someone accompanying this pt? yes    Is the patient using any DME equipment during OV? no    Depression Screening:  PHQ over the last two weeks 6/13/2018   Little interest or pleasure in doing things Several days   Feeling down, depressed or hopeless Several days   Total Score PHQ 2 2       Learning Assessment:  No flowsheet data found. Abuse Screening:  Abuse Screening Questionnaire 6/13/2018   Do you ever feel afraid of your partner? N   Are you in a relationship with someone who physically or mentally threatens you? N   Is it safe for you to go home? Y       Fall Risk  Fall Risk Assessment, last 12 mths 6/13/2018   Able to walk? Yes   Fall in past 12 months? No           Health Maintenance reviewed and discussed per provider. Pt is due for   Health Maintenance Due   Topic Date Due    DTaP/Tdap/Td series (1 - Tdap) 11/05/1944    ZOSTER VACCINE AGE 60>  09/05/1983    Bone Densitometry (Dexa) Screening  11/05/1988    Pneumococcal 65+ Low/Medium Risk (1 of 2 - PCV13) 11/05/1988    MEDICARE YEARLY EXAM  04/25/2018    Please order/place referral if appropriate. Advance Directive:  1. Do you have an advance directive in place? Patient Reply:yes, will get copy to office per Daughter. 2. If not, would you like material regarding how to put one in place? Patient Reply: no    Coordination of Care:  1. Have you been to the ER, urgent care clinic since your last visit? Hospitalized since your last visit? no    2. Have you seen or consulted any other health care providers outside of the [address] [place] [address] since your last visit? Include any pap smears or colon screening.  no

## 2018-06-13 NOTE — PROGRESS NOTES
This is the Subsequent Medicare Annual Wellness Exam, performed 12 months or more after the Initial AWV or the last Subsequent AWV    I have reviewed the patient's medical history in detail and updated the computerized patient record. History     Past Medical History:   Diagnosis Date    Hypercholesterolemia     Hypertension       Past Surgical History:   Procedure Laterality Date    HX CATARACT REMOVAL Left     HX  SECTION       Current Outpatient Prescriptions   Medication Sig Dispense Refill    nepafenac (ILEVRO) 0.3 % drps Apply 1 Drop to eye daily. Indications: POSTOPERATIVE OCULAR PAIN, 1 drop left eye daily      valsartan-hydroCHLOROthiazide (DIOVAN-HCT) 160-25 mg per tablet Take 1 Tab by mouth daily. Indications: hypertension 90 Tab 3     Allergies   Allergen Reactions    Aspirin Itching    Pcn [Penicillins] Unable to Obtain     Family History   Problem Relation Age of Onset    Cancer Son     Lung Disease Son      Social History   Substance Use Topics    Smoking status: Never Smoker    Smokeless tobacco: Never Used    Alcohol use No     Patient Active Problem List   Diagnosis Code    HTN, goal below 150/90 I10    Bilateral hearing loss H91.93    Late onset Alzheimer's disease with behavioral disturbance G30.1, F02.81    Hyperlipidemia E78.5    TB lung, latent R76.11    Class 1 obesity due to excess calories with serious comorbidity and body mass index (BMI) of 34.0 to 34.9 in adult E66.09, Z68.34    Primary osteoarthritis involving multiple joints M15.0    Post herpetic neuralgia B02.29       Depression Risk Factor Screening:     PHQ over the last two weeks 2018   Little interest or pleasure in doing things Several days   Feeling down, depressed or hopeless Several days   Total Score PHQ 2 2     Alcohol Risk Factor Screening: You do not drink alcohol or very rarely.     Functional Ability and Level of Safety:   Hearing Loss  Has hearing aids that don't fit - doesn't want more. Activities of Daily Living  The home contains: no safety equipment. Patient does total self care    Fall Risk  Fall Risk Assessment, last 12 mths 6/13/2018   Able to walk? Yes   Fall in past 12 months? No       Abuse Screen  Patient is not abused    Cognitive Screening   Evaluation of Cognitive Function:  Has your family/caregiver stated any concerns about your memory: yes - diagnosis of dementia      Patient Care Team   Patient Care Team:  Roxy Clancy MD as PCP - General (Family Practice)  Jane Sprague MD as Physician (Ophthalmology)    Assessment/Plan   Education and counseling provided:  Are appropriate based on today's review and evaluation    Diagnoses and all orders for this visit:    1. Medicare annual wellness visit, subsequent    2. Screening for alcoholism  -     Annual  Alcohol Screen 15 min ()    3.  Screening for depression  -     Depression Screen Annual      Health Maintenance Due   Topic Date Due    DTaP/Tdap/Td series (1 - Tdap) 11/05/1944    ZOSTER VACCINE AGE 60>  09/05/1983    Bone Densitometry (Dexa) Screening  11/05/1988    Pneumococcal 65+ Low/Medium Risk (1 of 2 - PCV13) 11/05/1988    MEDICARE YEARLY EXAM  04/25/2018

## 2018-06-13 NOTE — MR AVS SNAPSHOT
[address] 1 [facility] [address] 
[phone] Patient: Nahede Will MRN: [id number] Visit Information Date & Time Provider Department Dept. Phone Encounter #  
 6/13/2018  2:30 PM Eagle Callejas MD [place] [id number] Follow-up Instructions Return in about 1 year (around 6/13/2019), or if symptoms worsen or fail to improve. Upcoming Health Maintenance Date Due DTaP/Tdap/Td series (1 - Tdap) 11/5/1944 ZOSTER VACCINE AGE 60> 9/5/1983 Bone Densitometry (Dexa) Screening 11/5/1988 Pneumococcal 65+ Low/Medium Risk (1 of 2 - PCV13) 11/5/1988 MEDICARE YEARLY EXAM 4/25/2018 Influenza Age 5 to Adult 8/1/2018 GLAUCOMA SCREENING Q2Y 12/6/2019 Allergies as of 6/13/2018  Review Complete On: 6/13/2018 By: Eagle Callejas MD  
  
 Severity Noted Reaction Type Reactions Aspirin  07/26/2016    Itching Pcn [Penicillins]  09/26/2017    Unable to Obtain Current Immunizations  Never Reviewed Name Date Influenza High Dose Vaccine PF 9/26/2017 Not reviewed this visit You Were Diagnosed With   
  
 Codes Comments Medicare annual wellness visit, subsequent    -  Primary ICD-10-CM: Z00.00 ICD-9-CM: V70.0 Screening for alcoholism     ICD-10-CM: Z13.89 ICD-9-CM: V79.1 Screening for depression     ICD-10-CM: Z13.89 ICD-9-CM: V79.0 Late onset Alzheimer's disease with behavioral disturbance     ICD-10-CM: G30.1, F02.81 ICD-9-CM: 331.0, 294.11 Vitals BP Pulse Temp Resp Height(growth percentile) Weight(growth percentile) 140/58 (BP 1 Location: Right arm, BP Patient Position: Sitting) 63 97.9 °F (36.6 °C) (Oral) 18 4' 11\" (1.499 m) 169 lb (76.7 kg) SpO2 BMI OB Status Smoking Status 96% 34.13 kg/m2 Postmenopausal Never Smoker BMI and BSA Data Body Mass Index Body Surface Area 34.13 kg/m 2 1.79 m 2 Preferred Pharmacy Pharmacy Name Phone [address] [place] [address], [address] [place] [address] [phone] Your Updated Medication List  
  
   
This list is accurate as of 6/13/18  3:42 PM.  Always use your most recent med list.  
  
  
  
  
 ILEVRO 0.3 % Drps Generic drug:  nepafenac Apply 1 Drop to eye daily. Indications: POSTOPERATIVE OCULAR PAIN, 1 drop left eye daily  
  
 valsartan-hydroCHLOROthiazide 160-25 mg per tablet Commonly known as:  DIOVAN-HCT Take 1 Tab by mouth daily. Indications: hypertension We Performed the Following [address] [[id number] \[facility]\""] [place] ANNUAL ALCOHOL SCREEN 15 MIN [id number] \[facility]\""] Follow-up Instructions Return in about 1 year (around 6/13/2019), or if symptoms worsen or fail to improve. Patient Instructions Medicare Wellness Visit, Female The best way to live healthy is to have a lifestyle where you eat a well-balanced diet, exercise regularly, limit alcohol use, and quit all forms of tobacco/nicotine, if applicable. Regular preventive services are another way to keep healthy. Preventive services (vaccines, screening tests, monitoring & exams) can help personalize your care plan, which helps you manage your own care. Screening tests can find health problems at the earliest stages, when they are easiest to treat. [facility] follows the current, evidence-based guidelines published by the [facility] [place] [place] (USPSTF) when recommending preventive services for our patients. Because we follow these guidelines, sometimes recommendations change over time as research supports it. (For example, mammograms used to be recommended annually. Even though Medicare will still pay for an annual mammogram, the newer guidelines recommend a mammogram every two years for women of average risk.) Of course, you and your provider may decide to screen more often for some diseases, based on your risk and co-morbidities (chronic disease you are already diagnosed with). Preventive services for you include: - Medicare offers their members a free annual wellness visit, which is time for you and your primary care provider to discuss and plan for your preventive service needs. Take advantage of this benefit every year! 
 
-All people over age 72 should receive the recommended pneumonia vaccines. Current USPSTF guidelines recommend a series of two vaccines for the best pneumonia protection.  
 
-All adults should have a yearly flu vaccine and a tetanus vaccine every 10 years. All adults age 61 years should receive a shingles vaccine once in their lifetime.   
 
-A bone mass density test is recommended when a woman turns 65 to screen for osteoporosis. This test is only recommended once as a screening. Some providers will use this same test as a disease monitoring tool if you already have osteoporosis. -All adults age 38-68 years who are overweight should have a diabetes screening test once every three years.  
 
-Other screening tests & preventive services for persons with diabetes include: an eye exam to screen for diabetic retinopathy, a kidney function test, a foot exam, and stricter control over your cholesterol.  
 
-Cardiovascular screening for adults with routine risk involves an electrocardiogram (ECG) at intervals determined by the provider.  
 
-Colorectal cancer screenings should be done for adults age 54-65 years with normal risk. There are a number of acceptable methods of screening for this type of cancer. Each test has its own benefits and drawbacks. Discuss with your provider what is most appropriate for you during your annual wellness visit. The different tests include: colonoscopy (considered the best screening method), a fecal occult blood test, a fecal DNA test, and sigmoidoscopy. -Breast cancer screenings are recommended every other year for women of normal risk age 54-69 years.  
 
-Cervical cancer screenings for women over age 72 are only recommended with certain risk factors.  
 
-All adults born between [facility] should be screened once for Hepatitis C. Here is a list of your current Health Maintenance items (your personalized list of preventive services) with a due date: 
Health Maintenance Due Topic Date Due  
 DTaP/Tdap/Td  (1 - Tdap) 11/05/1944  Shingles Vaccine  09/05/1983  Bone Mineral Density   11/05/1988  Pneumococcal Vaccine (1 of 2 - PCV13) 11/05/1988 [address] [place] [address] Annual Well Visit  04/25/2018 Introducing hospitals & HEALTH SERVICES! Manny Murphy introduces [employer] patient portal. Now you can access parts of your medical record, email your doctor's office, and request medication refills online. 1. In your internet browser, go to [email or web address] 2. Click on the First Time User? Click Here link in the Sign In box. You will see the New Member Sign Up page. 3. Enter your [employer] Access Code exactly as it appears below. You will not need to use this code after youve completed the sign-up process. If you do not sign up before the expiration date, you must request a new code. · [employer] Access Code: [email or web address] Expires: 7/24/2018  3:42 PM 
 
4. Enter the last four digits of your Social Security Number (xxxx) and Date of Birth (mm/dd/yyyy) as indicated and click Submit. You will be taken to the next sign-up page. 5. Create a [employer]Hive ID. This will be your JobHive login ID and cannot be changed, so think of one that is secure and easy to remember. 6. Create a [employer]Hive password. You can change your password at any time. 7. Enter your Password Reset Question and Answer. This can be used at a later time if you forget your password. 8. Enter your e-mail address.  You will receive e-mail notification when new information is available in P[employer]51. 9. Click Sign Up. You can now view and download portions of your medical record. 10. Click the Download Summary menu link to download a portable copy of your medical information. If you have questions, please visit the Frequently Asked Questions section of the [employer] website. Remember, [employer] is NOT to be used for urgent needs. For medical emergencies, dial 911. Now available from your iPhone and Android! Please provide this summary of care documentation to your next provider. Your primary care clinician is listed as Lana Ashford. If you have any questions after today's visit, please call [phone].

## 2018-06-13 NOTE — PATIENT INSTRUCTIONS
Medicare Wellness Visit, Female    The best way to live healthy is to have a lifestyle where you eat a well-balanced diet, exercise regularly, limit alcohol use, and quit all forms of tobacco/nicotine, if applicable. Regular preventive services are another way to keep healthy. Preventive services (vaccines, screening tests, monitoring & exams) can help personalize your care plan, which helps you manage your own care. Screening tests can find health problems at the earliest stages, when they are easiest to treat. [address] [facility] follows the current, evidence-based guidelines published by the [facility] [place] ([facility]) when recommending preventive services for our patients. Because we follow these guidelines, sometimes recommendations change over time as research supports it. (For example, mammograms used to be recommended annually. Even though Medicare will still pay for an annual mammogram, the newer guidelines recommend a mammogram every two years for women of average risk.)    Of course, you and your provider may decide to screen more often for some diseases, based on your risk and co-morbidities (chronic disease you are already diagnosed with). Preventive services for you include:    - Medicare offers their members a free annual wellness visit, which is time for you and your primary care provider to discuss and plan for your preventive service needs. Take advantage of this benefit every year!    -All people over age 72 should receive the recommended pneumonia vaccines. Current USPSTF guidelines recommend a series of two vaccines for the best pneumonia protection.     -All adults should have a yearly flu vaccine and a tetanus vaccine every 10 years. All adults age 61 years should receive a shingles vaccine once in their lifetime.      -A bone mass density test is recommended when a woman turns 65 to screen for osteoporosis.  This test is only recommended once as a screening. Some providers will use this same test as a disease monitoring tool if you already have osteoporosis. -All adults age 38-68 years who are overweight should have a diabetes screening test once every three years.     -Other screening tests & preventive services for persons with diabetes include: an eye exam to screen for diabetic retinopathy, a kidney function test, a foot exam, and stricter control over your cholesterol.     -Cardiovascular screening for adults with routine risk involves an electrocardiogram (ECG) at intervals determined by the provider.     -Colorectal cancer screenings should be done for adults age 54-65 years with normal risk. There are a number of acceptable methods of screening for this type of cancer. Each test has its own benefits and drawbacks. Discuss with your provider what is most appropriate for you during your annual wellness visit. The different tests include: colonoscopy (considered the best screening method), a fecal occult blood test, a fecal DNA test, and sigmoidoscopy. -Breast cancer screenings are recommended every other year for women of normal risk age 54-69 years.     -Cervical cancer screenings for women over age 72 are only recommended with certain risk factors.     -All adults born between [facility] should be screened once for Hepatitis C.      Here is a list of your current Health Maintenance items (your personalized list of preventive services) with a due date:  Health Maintenance Due   Topic Date Due    DTaP/Tdap/Td  (1 - Tdap) 11/05/1944    Shingles Vaccine  09/05/1983    Bone Mineral Density   11/05/1988    Pneumococcal Vaccine (1 of 2 - PCV13) 11/05/1988    Annual Well Visit  04/25/2018

## 2018-06-13 NOTE — ACP (ADVANCE CARE PLANNING)
Daughter is her appointed agent for medical affairs, her son has a general power of . Advised the daughter to bring in her MPOA to be scanned.

## 2018-08-28 ENCOUNTER — OFFICE VISIT (OUTPATIENT)
Dept: FAMILY MEDICINE CLINIC | Facility: CLINIC | Age: 83
End: 2018-08-28

## 2018-08-28 VITALS
DIASTOLIC BLOOD PRESSURE: 52 MMHG | RESPIRATION RATE: 16 BRPM | TEMPERATURE: 97.4 F | HEIGHT: 59 IN | OXYGEN SATURATION: 95 % | HEART RATE: 61 BPM | SYSTOLIC BLOOD PRESSURE: 139 MMHG | WEIGHT: 164 LBS | BODY MASS INDEX: 33.06 KG/M2

## 2018-08-28 DIAGNOSIS — E66.09 CLASS 1 OBESITY DUE TO EXCESS CALORIES WITH SERIOUS COMORBIDITY AND BODY MASS INDEX (BMI) OF 34.0 TO 34.9 IN ADULT: ICD-10-CM

## 2018-08-28 DIAGNOSIS — G30.1 LATE ONSET ALZHEIMER'S DISEASE WITH BEHAVIORAL DISTURBANCE (HCC): ICD-10-CM

## 2018-08-28 DIAGNOSIS — F02.818 LATE ONSET ALZHEIMER'S DISEASE WITH BEHAVIORAL DISTURBANCE (HCC): ICD-10-CM

## 2018-08-28 DIAGNOSIS — E78.5 HYPERLIPIDEMIA, UNSPECIFIED HYPERLIPIDEMIA TYPE: ICD-10-CM

## 2018-08-28 DIAGNOSIS — I10 HTN, GOAL BELOW 150/90: Primary | ICD-10-CM

## 2018-08-28 RX ORDER — LOSARTAN POTASSIUM AND HYDROCHLOROTHIAZIDE 25; 100 MG/1; MG/1
1 TABLET ORAL DAILY
Qty: 90 TAB | Refills: 3 | Status: SHIPPED | OUTPATIENT
Start: 2018-08-28 | End: 2019-09-23 | Stop reason: SDUPTHER

## 2018-08-28 NOTE — MR AVS SNAPSHOT
[address] [facility] 
 
 
 [address] [facility] [address] [place] [address] 
[phone] Patient: Rocklin Alpers MRN: [id number] Visit Information Date & Time Provider Department Dept. Phone Encounter #  
 8/28/2018 10:15 AM Marcelo Burleson MD [facility] [phone] [id number] Follow-up Instructions Return in about 6 months (around 2/28/2019). Your Appointments 6/14/2019  2:30 PM  
Office Visit with Marcelo Burleson MD  
[facility] [address] [facility] Appt Note: Medicare Wellness   
 14 [facility] 1 [facility] [address]  
[phone]  
  
   
 14 [address] Upcoming Health Maintenance Date Due DTaP/Tdap/Td series (1 - Tdap) 11/5/1944 ZOSTER VACCINE AGE 60> 9/5/1983 Bone Densitometry (Dexa) Screening 11/5/1988 Pneumococcal 65+ Low/Medium Risk (1 of 2 - PCV13) 11/5/1988 Influenza Age 5 to Adult 8/1/2018 MEDICARE YEARLY EXAM 6/14/2019 GLAUCOMA SCREENING Q2Y 12/6/2019 Allergies as of 8/28/2018  Review Complete On: 8/28/2018 By: Marcelo Burleson MD  
  
 Severity Noted Reaction Type Reactions Aspirin  07/26/2016    Itching Pcn [Penicillins]  09/26/2017    Unable to Obtain Current Immunizations  Never Reviewed Name Date Influenza High Dose Vaccine PF 9/26/2017 Not reviewed this visit You Were Diagnosed With   
  
 Codes Comments HTN, goal below 150/90    -  Primary ICD-10-CM: I10 
ICD-9-CM: 401.9 Hyperlipidemia, unspecified hyperlipidemia type     ICD-10-CM: E78.5 ICD-9-CM: 272.4 Late onset Alzheimer's disease with behavioral disturbance     ICD-10-CM: G30.1, F02.81 ICD-9-CM: 331.0, 294.11  Class 1 obesity due to excess calories with serious comorbidity and body mass index (BMI) of 34.0 to 34.9 in adult     ICD-10-CM: E66.09, Z68.34 
ICD-9-CM: 278.00, V85.34   
  
 Vitals BP Pulse Temp Resp Height(growth percentile) Weight(growth percentile) 139/52 61 97.4 °F (36.3 °C) (Oral) 16 4' 11\" (1.499 m) 164 lb (74.4 kg) SpO2 BMI OB Status Smoking Status 95% 33.12 kg/m2 Postmenopausal Never Smoker Vitals History BMI and BSA Data Body Mass Index Body Surface Area  
 33.12 kg/m 2 1.76 m 2 Preferred Pharmacy Pharmacy Name Phone [address] [place] [address] [phone] Your Updated Medication List  
  
   
This list is accurate as of 8/28/18 11:00 AM.  Always use your most recent med list.  
  
  
  
  
 ILEVRO 0.3 % Drps Generic drug:  nepafenac Apply 1 Drop to eye daily. Indications: POSTOPERATIVE OCULAR PAIN, 1 drop left eye daily  
  
 losartan-hydroCHLOROthiazide 100-25 mg per tablet Commonly known as:  HYZAAR Take 1 Tab by mouth daily. Indications: hypertension Prescriptions Sent to Pharmacy Refills  
 losartan-hydroCHLOROthiazide (HYZAAR) 100-25 mg per tablet 3 Sig: Take 1 Tab by mouth daily. Indications: hypertension Class: Normal  
 Pharmacy: [facility] DR ELDON VERDE [address] Ph #: [phone] Route: Oral  
  
Follow-up Instructions Return in about 6 months (around 2/28/2019). Introducing \[facility]\"" & HEALTH SERVICES! Zabrina Fragoso introduces [employer] patient portal. Now you can access parts of your medical record, email your doctor's office, and request medication refills online. 1. In your internet browser, go to [email or web address] 2. Click on the First Time User? Click Here link in the Sign In box. You will see the New Member Sign Up page. 3. Enter your [employer] Access Code exactly as it appears below. You will not need to use this code after youve completed the sign-up process. If you do not sign up before the expiration date, you must request a new code. · [employer] Access Code: [email or web address] Expires: 10/31/2018 10:21 AM 
 
4. Enter the last four digits of your Social Security Number (xxxx) and Date of Birth (mm/dd/yyyy) as indicated and click Submit. You will be taken to the next sign-up page. 5. Create a [employer] ID. This will be your [employer] login ID and cannot be changed, so think of one that is secure and easy to remember. 6. Create a [employer] password. You can change your password at any time. 7. Enter your Password Reset Question and Answer. This can be used at a later time if you forget your password. 8. Enter your e-mail address. You will receive e-mail notification when new information is available in [address] 9. Click Sign Up. You can now view and download portions of your medical record. 10. Click the Download Summary menu link to download a portable copy of your medical information. If you have questions, please visit the Frequently Asked Questions section of the [employer] website. Remember, [employer] is NOT to be used for urgent needs. For medical emergencies, dial 911. Now available from your iPhone and Android! Please provide this summary of care documentation to your next provider. Your primary care clinician is listed as Ev Avila. If you have any questions after today's visit, please call [phone].

## 2018-08-28 NOTE — PROGRESS NOTES
HISTORY OF PRESENT ILLNESS Allison Jaimes is a 80 y.o. female. HPI Comments: Seen in follow-up for HTN, hyperlipidemia, dementia. No problems with medications, though she is taking Valsartan. No other concerns today. She did get respite care for a week (kind of rough). Follow Up Chronic Condition Pertinent negatives include no chest pain, no headaches and no shortness of breath. Past Medical History:  
Diagnosis Date  Hypercholesterolemia  Hypertension Past Surgical History:  
Procedure Laterality Date  HX CATARACT REMOVAL Left  HX  SECTION History Smoking Status  Never Smoker Smokeless Tobacco  
 Never Used Current Outpatient Prescriptions Medication Sig  
 nepafenac (ILEVRO) 0.3 % drps Apply 1 Drop to eye daily. Indications: POSTOPERATIVE OCULAR PAIN, 1 drop left eye daily  valsartan-hydroCHLOROthiazide (DIOVAN-HCT) 160-25 mg per tablet Take 1 Tab by mouth daily. Indications: hypertension No current facility-administered medications for this visit. Review of Systems Constitutional: Negative for chills and fever. Respiratory: Negative for shortness of breath. Cardiovascular: Negative for chest pain, palpitations and leg swelling. Gastrointestinal: Negative for nausea and vomiting. Neurological: Negative for tingling, focal weakness and headaches. Psychiatric/Behavioral: The patient does not have insomnia. Visit Vitals  /52  Pulse 61  Temp 97.4 °F (36.3 °C) (Oral)  Resp 16  
 Ht 4' 11\" (1.499 m)  Wt 164 lb (74.4 kg)  SpO2 95%  BMI 33.12 kg/m2 Physical Exam  
Constitutional: She appears well-developed and well-nourished. No distress. Neck: Neck supple. No thyromegaly present. Carotid bruit absent Cardiovascular: Normal rate, regular rhythm and intact distal pulses. Exam reveals no gallop and no friction rub. No murmur heard. Pulmonary/Chest: Effort normal and breath sounds normal. No respiratory distress. Musculoskeletal: She exhibits no edema. Lymphadenopathy:  
  She has no cervical adenopathy. Neurological: She is alert. Skin: Skin is warm and dry. Psychiatric: She has a normal mood and affect. Her behavior is normal. Judgment and thought content normal.  
Nursing note and vitals reviewed. ASSESSMENT and PLAN 
  ICD-10-CM ICD-9-CM 1. HTN, goal below 150/90 I10 401.9 losartan-hydroCHLOROthiazide (HYZAAR) 100-25 mg per tablet 2. Hyperlipidemia, unspecified hyperlipidemia type E78.5 272.4 3. Late onset Alzheimer's disease with behavioral disturbance G30.1 331.0 F02.81 294.11   
4. Class 1 obesity due to excess calories with serious comorbidity and body mass index (BMI) of 34.0 to 34.9 in adult E66.09 278.00   
 Z68.34 V85.34 Follow-up Disposition: 
Return in about 6 months (around 2/28/2019). the following changes in treatment are made: Will change her to Hyzaar due to the recall of Valsartan products. reviewed medications and side effects in detail Plan of care reviewed - daughter verbalize(s) understanding and agreement.

## 2018-10-25 ENCOUNTER — OFFICE VISIT (OUTPATIENT)
Dept: FAMILY MEDICINE CLINIC | Age: 83
End: 2018-10-25

## 2018-10-25 VITALS
OXYGEN SATURATION: 99 % | SYSTOLIC BLOOD PRESSURE: 149 MMHG | BODY MASS INDEX: 33.06 KG/M2 | TEMPERATURE: 97.7 F | HEART RATE: 57 BPM | HEIGHT: 59 IN | WEIGHT: 164 LBS | RESPIRATION RATE: 18 BRPM | DIASTOLIC BLOOD PRESSURE: 66 MMHG

## 2018-10-25 DIAGNOSIS — M79.672 BILATERAL FOOT PAIN: ICD-10-CM

## 2018-10-25 DIAGNOSIS — Z23 ENCOUNTER FOR IMMUNIZATION: Primary | ICD-10-CM

## 2018-10-25 DIAGNOSIS — B37.2 CANDIDAL INTERTRIGO: ICD-10-CM

## 2018-10-25 DIAGNOSIS — M79.671 BILATERAL FOOT PAIN: ICD-10-CM

## 2018-10-25 RX ORDER — NYSTATIN 100000 [USP'U]/G
POWDER TOPICAL 4 TIMES DAILY
Qty: 60 G | Refills: 1 | Status: SHIPPED | OUTPATIENT
Start: 2018-10-25 | End: 2019-12-02

## 2018-10-25 RX ORDER — NYSTATIN 100000 U/G
CREAM TOPICAL 2 TIMES DAILY
Qty: 30 G | Refills: 0 | Status: SHIPPED | OUTPATIENT
Start: 2018-10-25

## 2018-10-25 NOTE — PROGRESS NOTES
HISTORY OF PRESENT ILLNESS  Fran Gunderson is a 80 y.o. female. HPI  Fran Gunderson is a 80 y.o. female who presents to the office today for rash. She comes in for an acute visit. [facility] She is brought in today by her daughter for a rash on her abdomen. It is located within the folds of the stomach. This started a month ago and her daughter applied neosporin and this seemed to help. The [place] nurse came by Tuesday and suggested she be brought in to be evaluated. She says this itches and there is an odor. Last night she also was complaining to her daughter about foot pain. She could not verbalize where they pain was other than her left foot. her daughter soaked her feet, cut back her toenails, but does not think this helped. She wears sneakers all the time, never walks barefoot. Not limping and no falls. Chief Complaint   Patient presents with    Rash    Foot Pain       Current Outpatient Medications on File Prior to Visit   Medication Sig Dispense Refill    losartan-hydroCHLOROthiazide (HYZAAR) 100-25 mg per tablet Take 1 Tab by mouth daily. Indications: hypertension 90 Tab 3    nepafenac (ILEVRO) 0.3 % drps Apply 1 Drop to eye daily. Indications: POSTOPERATIVE OCULAR PAIN, 1 drop left eye daily       No current facility-administered medications on file prior to visit. Allergies   Allergen Reactions    Aspirin Itching    Pcn [Penicillins] Unable to Obtain     Past Medical History:   Diagnosis Date    Hypercholesterolemia     Hypertension      Social History     Tobacco Use   Smoking Status Never Smoker   Smokeless Tobacco Never Used     Social History     Substance and Sexual Activity   Alcohol Use No     Family History   Problem Relation Age of Onset    Cancer Son     Lung Disease Son        Review of Systems   Constitutional: Negative for fever. Gastrointestinal: Negative for abdominal pain and vomiting. Musculoskeletal: Positive for joint pain. Negative for falls. Foot pain   Skin: Positive for itching and rash. On abdomen     Visit Vitals  /66 (BP 1 Location: Left arm, BP Patient Position: Sitting)   Pulse (!) 57   Temp 97.7 °F (36.5 °C) (Oral)   Resp 18   Ht 4' 11\" (1.499 m)   Wt 164 lb (74.4 kg)   SpO2 99%   BMI 33.12 kg/m²     Physical Exam   Constitutional: She appears well-developed and well-nourished. No distress. Musculoskeletal:        Right foot: Normal.        Left foot: Normal.        Feet:    Skin:        Nursing note and vitals reviewed. ASSESSMENT and PLAN    ICD-10-CM ICD-9-CM    1. Encounter for immunization Z23 V03.89 INFLUENZA VACCINE INACTIVATED (IIV), SUBUNIT, ADJUVANTED, IM      FL IMMUNIZ ADMIN,1 SINGLE/COMB VAC/TOXOID   2. Candidal intertrigo B37.2 112.3 nystatin (MYCOSTATIN) powder      nystatin (MYCOSTATIN) topical cream   3. Bilateral foot pain M79.671 729.5     M79.672        Apply topical antifungal. Keep the skin folds clean and dry as much as possible. Follow up with PCP is there is no improvement. She may have plantar fasciitis based on exam. Otherwise, she did not seem to be in any pain when examining her feet, and she was walking without difficulty. We reviewed icing, massaging and stretching both bottoms of feet. Flu vaccine today  Patient agrees with the plan and verbalizes understanding. Follow-up Disposition:  Return if symptoms worsen or fail to improve- with PCP.     Juan Antonio Daniel PA-C  10/25/2018

## 2018-10-25 NOTE — PATIENT INSTRUCTIONS
Plantar Fasciitis: Care Instructions  Your Care Instructions    Plantar fasciitis is pain and inflammation of the plantar fascia, the tissue at the bottom of your foot that connects the heel bone to the toes. The plantar fascia also supports the arch. If you strain the plantar fascia, it can develop small tears and cause heel pain when you stand or walk. Plantar fasciitis can be caused by running or other sports. It also may occur in people who are overweight or who have high arches or flat feet. You may get plantar fasciitis if you walk or stand for long periods, or have a tight Achilles tendon or calf muscles. You can improve your foot pain with rest and other care at home. It might take a few weeks to a few months for your foot to heal completely. Follow-up care is a key part of your treatment and safety. Be sure to make and go to all appointments, and call your doctor if you are having problems. It's also a good idea to know your test results and keep a list of the medicines you take. How can you care for yourself at home? · Rest your feet often. Reduce your activity to a level that lets you avoid pain. If possible, do not run or walk on hard surfaces. · Take pain medicines exactly as directed. ? If the doctor gave you a prescription medicine for pain, take it as prescribed. ? If you are not taking a prescription pain medicine, take an over-the-counter anti-inflammatory medicine for pain and swelling, such as ibuprofen (Advil, Motrin) or naproxen (Aleve). Read and follow all instructions on the label. · Use ice massage to help with pain and swelling. You can use an ice cube or an ice cup several times a day. To make an ice cup, fill a paper cup with water and freeze it. Cut off the top of the cup until a half-inch of ice shows. Hold onto the remaining paper to use the cup. Rub the ice in small circles over the area for 5 to 7 minutes.   · Contrast baths, which alternate hot and cold water, can also help reduce swelling. But because heat alone may make pain and swelling worse, end a contrast bath with a soak in cold water. · Wear a night splint if your doctor suggests it. A night splint holds your foot with the toes pointed up and the foot and ankle at a 90-degree angle. This position gives the bottom of your foot a constant, gentle stretch. · Do simple exercises such as calf stretches and towel stretches 2 to 3 times each day, especially when you first get up in the morning. These can help the plantar fascia become more flexible. They also make the muscles that support your arch stronger. Hold these stretches for 15 to 30 seconds per stretch. Repeat 2 to 4 times. ? Stand about 1 foot from a wall. Place the palms of both hands against the wall at chest level. Lean forward against the wall, keeping one leg with the knee straight and heel on the ground while bending the knee of the other leg.  ? Sit down on the floor or a mat with your feet stretched in front of you. Roll up a towel lengthwise, and loop it over the ball of your foot. Holding the towel at both ends, gently pull the towel toward you to stretch your foot. · Wear shoes with good arch support. Athletic shoes or shoes with a well-cushioned sole are good choices. · Try heel cups or shoe inserts (orthotics) to help cushion your heel. You can buy these at many shoe stores. · Put on your shoes as soon as you get out of bed. Going barefoot or wearing slippers may make your pain worse. · Reach and stay at a good weight for your height. This puts less strain on your feet. When should you call for help? Call your doctor now or seek immediate medical care if:    · You have heel pain with fever, redness, or warmth in your heel.     · You cannot put weight on the sore foot.    Watch closely for changes in your health, and be sure to contact your doctor if:    · You have numbness or tingling in your heel.     · Your heel pain lasts more than 2 weeks. Where can you learn more? Go to http://pilo-shivani.info/. Luisa Jaqueline in the search box to learn more about \"Plantar Fasciitis: Care Instructions. \"  Current as of: November 29, 2017  Content Version: 11.8  © 2871-7921 [employer]. Care instructions adapted under license by [employer] (which disclaims liability or warranty for this information). If you have questions about a medical condition or this instruction, always ask your healthcare professional. [address] any warranty or liability for your use of this information. Yeast Skin Infection: Care Instructions  Your Care Instructions    Yeast normally lives on your skin. Sometimes too much yeast can overgrow in certain areas of the skin and cause an infection. The infection causes red, scaly, moist patches on your skin that may itch. Common areas for skin yeast infections are skin folds under the breasts or belly area. The warm and moist areas in the skin folds can make it easier for yeast to overgrow. Yeast infections also can be found on other parts of the body such as the groin or armpits. You will probably get a cream or ointment that contains an antifungal medicine. Examples of these are miconazole and clotrimazole. You put it on your skin to treat the infection. Your doctor may give you a prescription for the cream or ointment. Or you may be able to buy it without a prescription at most drugstores. If the infection is severe, the doctor will prescribe antifungal pills. A yeast infection usually goes away after about a week of treatment. But it's important to use the medicine for as long as your doctor tells you to. Follow-up care is a key part of your treatment and safety. Be sure to make and go to all appointments, and call your doctor if you are having problems. It's also a good idea to know your test results and keep a list of the medicines you take.   How can you care for yourself at home? · Be safe with medicines. Take your medicines exactly as prescribed. Call your doctor if you think you are having a problem with your medicine. · Keep your skin clean and dry. Your doctor may suggest using powder that contains an antifungal medicine in the skin folds. · Wear loose clothing. When should you call for help? Call your doctor now or seek immediate medical care if:    · You have symptoms of infection, such as:  ? Increased pain, swelling, warmth, or redness. ? Red streaks leading from the area. ? Pus draining from the area. ? A fever.    Watch closely for changes in your health, and be sure to contact your doctor if:    · You do not get better as expected. Where can you learn more? Go to http://pilo-shivani.info/. Enter P903 in the search box to learn more about \"Yeast Skin Infection: Care Instructions. \"  Current as of: April 18, 2018  Content Version: 11.8  © 3985-8186 [employer]. Care instructions adapted under license by Amanda Huff [employer] (which disclaims liability or warranty for this information). If you have questions about a medical condition or this instruction, always ask your healthcare professional. [address] any warranty or liability for your use of this information.

## 2018-10-25 NOTE — PROGRESS NOTES
Bruno Sahni is a 80 y.o. female c/o sore on abdomen in the rolls of her skin. It is now spreading to the other side. She is also c/o of her feet bothering her.

## 2019-01-23 ENCOUNTER — OFFICE VISIT (OUTPATIENT)
Dept: FAMILY MEDICINE CLINIC | Facility: CLINIC | Age: 84
End: 2019-01-23

## 2019-01-23 VITALS
RESPIRATION RATE: 16 BRPM | HEIGHT: 59 IN | DIASTOLIC BLOOD PRESSURE: 66 MMHG | BODY MASS INDEX: 34.07 KG/M2 | OXYGEN SATURATION: 97 % | TEMPERATURE: 97.1 F | HEART RATE: 52 BPM | SYSTOLIC BLOOD PRESSURE: 162 MMHG | WEIGHT: 169 LBS

## 2019-01-23 DIAGNOSIS — E78.5 HYPERLIPIDEMIA, UNSPECIFIED HYPERLIPIDEMIA TYPE: ICD-10-CM

## 2019-01-23 DIAGNOSIS — F02.818 LATE ONSET ALZHEIMER'S DISEASE WITH BEHAVIORAL DISTURBANCE (HCC): ICD-10-CM

## 2019-01-23 DIAGNOSIS — G30.1 LATE ONSET ALZHEIMER'S DISEASE WITH BEHAVIORAL DISTURBANCE (HCC): ICD-10-CM

## 2019-01-23 DIAGNOSIS — Z22.7 TB LUNG, LATENT: ICD-10-CM

## 2019-01-23 DIAGNOSIS — I10 HTN, GOAL BELOW 150/90: Primary | ICD-10-CM

## 2019-01-23 NOTE — PROGRESS NOTES
HISTORY OF PRESENT ILLNESS Shana Mesa is a 80 y.o. female. Seen in follow-up for HTN, hyperlipidemia, dementia, history of positive PPD. No problems with medications. She is moving to a new chronic care facility, and needs to have her CXR updated (within 30 days). Review of Systems Constitutional: Negative for chills and fever. Respiratory: Negative for shortness of breath. Cardiovascular: Negative for chest pain, palpitations and leg swelling. Gastrointestinal: Negative for nausea and vomiting. Neurological: Negative for tingling, focal weakness and headaches. Psychiatric/Behavioral: Positive for memory loss. The patient does not have insomnia. Visit Vitals /66 Pulse (!) 52 Temp 97.1 °F (36.2 °C) (Oral) Resp 16 Ht 4' 11\" (1.499 m) Wt 169 lb (76.7 kg) SpO2 97% BMI 34.13 kg/m² Physical Exam  
Constitutional: She appears well-developed and well-nourished. No distress. Neck: Neck supple. No thyromegaly present. Carotid bruit absent Cardiovascular: Normal rate, regular rhythm and intact distal pulses. Exam reveals no gallop and no friction rub. No murmur heard. Pulmonary/Chest: Effort normal and breath sounds normal. No respiratory distress. Musculoskeletal: She exhibits no edema. Lymphadenopathy:  
  She has no cervical adenopathy. Neurological: She is alert. Skin: Skin is warm and dry. Psychiatric: She has a normal mood and affect. Her behavior is normal. Judgment and thought content normal.  
Nursing note and vitals reviewed. ASSESSMENT and PLAN 
  ICD-10-CM ICD-9-CM 1. HTN, goal below 150/90 I10 401.9 2. Hyperlipidemia, unspecified hyperlipidemia type E78.5 272.4 3. Late onset Alzheimer's disease with behavioral disturbance G30.1 331.0 F02.81 294.11   
4. TB lung, latent R76.11 795.51 XR CHEST SNGL V Follow-up Disposition: 
Return in about 6 months (around 8/5/2019). current treatment plan is effective, no change in therapy 
reviewed medications and side effects in detail 
radiology results and schedule of future radiology studies reviewed with patient - PA CXR Plan of care reviewed - patient's daughter verbalize(s) understanding and agreement.

## 2019-01-29 ENCOUNTER — TELEPHONE (OUTPATIENT)
Dept: FAMILY MEDICINE CLINIC | Facility: CLINIC | Age: 84
End: 2019-01-29

## 2019-01-29 NOTE — TELEPHONE ENCOUNTER
Faxed over patient most recent chest xray results as requested, Attention: Alex Fitch to [facility] [phone], with confirmation.

## 2019-01-29 NOTE — TELEPHONE ENCOUNTER
Patient's daughter called stating she needs the chest xray faxed over to a nursing home.   [phone][facility]    Make it attention Sylvester Trinidad

## 2019-02-21 ENCOUNTER — TELEPHONE (OUTPATIENT)
Dept: FAMILY MEDICINE CLINIC | Facility: CLINIC | Age: 84
End: 2019-02-21

## 2019-02-21 NOTE — TELEPHONE ENCOUNTER
Most recent notes along with medication list faxed over to Renny Andrade with [facility] to [phone] as requested with confirmation.

## 2019-02-21 NOTE — TELEPHONE ENCOUNTER
Tiffany with [facility] Re[facility] called needing the H & P (or last couple of office Notes) and Med list.  Please fax to [phone]

## 2019-03-01 ENCOUNTER — TELEPHONE (OUTPATIENT)
Dept: FAMILY MEDICINE CLINIC | Facility: CLINIC | Age: 84
End: 2019-03-01

## 2019-07-17 ENCOUNTER — OFFICE VISIT (OUTPATIENT)
Dept: FAMILY MEDICINE CLINIC | Age: 84
End: 2019-07-17

## 2019-07-17 VITALS
WEIGHT: 172.2 LBS | SYSTOLIC BLOOD PRESSURE: 138 MMHG | DIASTOLIC BLOOD PRESSURE: 55 MMHG | HEIGHT: 59 IN | BODY MASS INDEX: 34.72 KG/M2 | TEMPERATURE: 96.7 F | RESPIRATION RATE: 12 BRPM | OXYGEN SATURATION: 97 % | HEART RATE: 60 BPM

## 2019-07-17 DIAGNOSIS — R06.2 WHEEZING: ICD-10-CM

## 2019-07-17 DIAGNOSIS — R05.3 CHRONIC COUGH: Primary | ICD-10-CM

## 2019-07-17 DIAGNOSIS — Z22.7 TB LUNG, LATENT: ICD-10-CM

## 2019-07-17 NOTE — PROGRESS NOTES
ROOM # 8    Kenyetta Martines presents today for   Chief Complaint   Patient presents with   Mir comes in with her daughter who states the pt has had a intermittent productive cough with clear phelgm for the past few months       Kenyetta Martines preferred language for health care discussion is english/other. Is someone accompanying this pt? Yes her daughter    Is the patient using any DME equipment during OV? no    Depression Screening:  3 most recent [facility] Screens 7/17/2019 1/23/2019 8/28/2018 6/13/2018 1/19/2018 9/26/2017   PHQ Not Done Medical Reason (indicate in comments) - - - - -   Little interest or pleasure in doing things Not at all Not at all Not at all Several days Not at all Not at all   Feeling down, depressed, irritable, or hopeless Several days Not at all Not at all Several days Several days Several days   Total Score PHQ 2 1 0 0 2 1 1       Learning Assessment:  Learning Assessment 7/17/2019   PRIMARY LEARNER Patient   HIGHEST LEVEL OF EDUCATION - PRIMARY LEARNER  4 YEARS OF COLLEGE   PRIMARY LANGUAGE ENGLISH   LEARNER PREFERENCE PRIMARY OTHER (COMMENT)   ANSWERED BY tiffanie MOSQUEDA OTHER       Abuse Screening:  Abuse Screening Questionnaire 6/13/2018   Do you ever feel afraid of your partner? N   Are you in a relationship with someone who physically or mentally threatens you? N   Is it safe for you to go home? Y       Fall Risk  Fall Risk Assessment, last 12 mths 7/17/2019 1/23/2019 8/28/2018 6/13/2018 1/19/2018 9/26/2017   Able to walk? Yes Yes Yes Yes Yes Yes   Fall in past 12 months? No No No No - No       Health Maintenance reviewed and discussed per provider. Yes    Kenyetta Martines is due for   Health Maintenance Due   Topic Date Due    Pneumococcal 65+ years (2 of 2 - PPSV23) 11/05/1988    MEDICARE YEARLY EXAM  06/14/2019         Please order/place referral if appropriate. Advance Directive:  1. Do you have an advance directive in place?  Patient Reply: no    2. If not, would you like material regarding how to put one in place? Patient Reply: no    Coordination of Care:  1. Have you been to the ER, urgent care clinic since your last visit? Hospitalized since your last visit? no    2. Have you seen or consulted any other health care providers outside of the [address] [place] [address] since your last visit? Include any pap smears or colon screening.  no

## 2019-07-17 NOTE — PROGRESS NOTES
HISTORY OF PRESENT ILLNESS  Aurora Pretty is a 80 y.o. female. HPI  Aurora Pretty is a 80 y.o. female who presents to the office today for cough  She comes in to establish care. I saw her once for an acute visit, but her PCP left the practice. Her daughter brings her in for a cough. She has hx of positive PPD and CXR in 1/2019 was negative for acute process. This cough has been going on for several months. Last week her daughter noticed that her mom started coughing during dinner. Her daughter does not think she was choking on food. She did hear some wheezing during the coughing fit. After a few minutes this resolved. She will have short episodes of coughing fits. There may be some clear mucus production, but for the most part it is dry. There is no fever, weakness or changes in baseline behavior and mood. She has dementia and is living at home with her daughter and family. Chief Complaint   Patient presents with   Alisusang comes in with her daughter who states the pt has had a intermittent productive cough with clear phelgm for the past few months       Current Outpatient Medications on File Prior to Visit   Medication Sig Dispense Refill    nystatin (MYCOSTATIN) powder Apply  to affected area four (4) times daily. 60 g 1    nystatin (MYCOSTATIN) topical cream Apply  to affected area two (2) times a day. 30 g 0    losartan-hydroCHLOROthiazide (HYZAAR) 100-25 mg per tablet Take 1 Tab by mouth daily. Indications: hypertension 90 Tab 3    nepafenac (ILEVRO) 0.3 % drps Apply 1 Drop to eye daily. Indications: POSTOPERATIVE OCULAR PAIN, 1 drop left eye daily       No current facility-administered medications on file prior to visit.       Allergies   Allergen Reactions    Aspirin Itching    Pcn [Penicillins] Unable to Obtain     Past Medical History:   Diagnosis Date    Dementia     Hypercholesterolemia     Hypertension      Social History     Tobacco Use   Smoking Status Never Smoker   Smokeless Tobacco Never Used     Social History     Substance and Sexual Activity   Alcohol Use No     Family History   Problem Relation Age of Onset    Cancer Son     Lung Disease Son      Review of Systems   Constitutional: Negative for chills, fever, malaise/fatigue and weight loss. HENT: Negative for congestion. Eyes: Negative for discharge and redness. Respiratory: Positive for cough and wheezing. Negative for hemoptysis, sputum production and shortness of breath. Cardiovascular: Negative for chest pain. Gastrointestinal: Negative for abdominal pain, diarrhea and vomiting. Musculoskeletal: Negative for falls. Skin: Negative for rash. Neurological: Negative for loss of consciousness. Psychiatric/Behavioral:        Dementia at baseline     Visit Vitals  /55   Pulse 60   Temp 96.7 °F (35.9 °C) (Oral)   Resp 12   Ht 4' 11\" (1.499 m)   Wt 172 lb 3.2 oz (78.1 kg)   SpO2 97%   BMI 34.78 kg/m²     Physical Exam   Constitutional: She appears well-developed and well-nourished. No distress. HENT:   Head: Atraumatic. Right Ear: Tympanic membrane and ear canal normal.   Left Ear: Tympanic membrane and ear canal normal.   Mouth/Throat: Uvula is midline, oropharynx is clear and moist and mucous membranes are normal.   Clear mucus in posterior pharynx   Neck: Neck supple. Cardiovascular: Normal rate, regular rhythm and normal heart sounds. Pulmonary/Chest: Effort normal and breath sounds normal. No respiratory distress. She has no wheezes. She has no rales. Lymphadenopathy:     She has no cervical adenopathy. Neurological: She is alert. Skin: Skin is warm and dry. Psychiatric:   Flat affect   Nursing note and vitals reviewed. ASSESSMENT and PLAN    ICD-10-CM ICD-9-CM    1. Chronic cough R05 786.2 XR CHEST PA LAT   2.  Wheezing R06.2 786.07 XR CHEST PA LAT   3. TB lung, latent R76.11 795.51       Lungs clear on exam. CXR today in the office does not show any obvious acute process. Will call with radiology reading. I have suggested her daughter feed her mechanical soft foods and monitor for coughing and choking during meals. If this is the case, will send speech therapy. Patient agrees with the plan and verbalizes understanding. Follow-up and Dispositions    · Return for [facility].        Ruby Dominguez PA-C  7/17/2019

## 2019-07-18 ENCOUNTER — TELEPHONE (OUTPATIENT)
Dept: FAMILY MEDICINE CLINIC | Age: 84
End: 2019-07-18

## 2019-07-18 NOTE — TELEPHONE ENCOUNTER
Daughter was speaking to the nurses about an order for incontinent supplies. Patient has occasional accidents and daughter would like to see if an order can be sent to [facility].      Please assist.

## 2019-08-05 NOTE — TELEPHONE ENCOUNTER
Per provider pt needs to have an appt for this. Francoise Mitchell, called and spoke with pt's daughter to inform her of need for appt. She stated she is not going to worry about it right now.

## 2019-09-23 DIAGNOSIS — I10 HTN, GOAL BELOW 150/90: ICD-10-CM

## 2019-09-23 RX ORDER — LOSARTAN POTASSIUM AND HYDROCHLOROTHIAZIDE 25; 100 MG/1; MG/1
1 TABLET ORAL DAILY
Qty: 90 TAB | Refills: 1 | Status: SHIPPED | OUTPATIENT
Start: 2019-09-23 | End: 2019-10-03 | Stop reason: SDUPTHER

## 2019-09-23 NOTE — TELEPHONE ENCOUNTER
----- Message from Lucila Patrick sent at 9/21/2019 12:33 PM EDT -----  Regarding: Jennifer Skiff: [phone]  Patient's daughter, Mrs. Yashira Polanco, would like to discuss refill option for Losartan-hctz 25mg. Stated patient may have enough to hold patient untl 10.03.2019 appointment, yet no refills available currently. Please callback at [phone].

## 2019-10-03 ENCOUNTER — OFFICE VISIT (OUTPATIENT)
Dept: FAMILY MEDICINE CLINIC | Age: 84
End: 2019-10-03

## 2019-10-03 VITALS
HEIGHT: 59 IN | TEMPERATURE: 97.6 F | SYSTOLIC BLOOD PRESSURE: 126 MMHG | OXYGEN SATURATION: 97 % | RESPIRATION RATE: 17 BRPM | WEIGHT: 174 LBS | HEART RATE: 60 BPM | BODY MASS INDEX: 35.08 KG/M2 | DIASTOLIC BLOOD PRESSURE: 63 MMHG

## 2019-10-03 DIAGNOSIS — Z71.89 ACP (ADVANCE CARE PLANNING): ICD-10-CM

## 2019-10-03 DIAGNOSIS — Z23 ENCOUNTER FOR IMMUNIZATION: ICD-10-CM

## 2019-10-03 DIAGNOSIS — I10 HTN, GOAL BELOW 150/90: ICD-10-CM

## 2019-10-03 DIAGNOSIS — Z00.00 MEDICARE ANNUAL WELLNESS VISIT, SUBSEQUENT: Primary | ICD-10-CM

## 2019-10-03 RX ORDER — LOSARTAN POTASSIUM AND HYDROCHLOROTHIAZIDE 25; 100 MG/1; MG/1
1 TABLET ORAL DAILY
Qty: 90 TAB | Refills: 1 | Status: SHIPPED | OUTPATIENT
Start: 2019-10-03

## 2019-10-03 NOTE — PROGRESS NOTES
Yonathan Knight is a 80 y.o. female presents to office for annual wellness  Medication list has been reviewed with Yonathan Eugene and updated as of today's date     Health Maintenance items with a due date reviewed with patient:  Health Maintenance Due   Topic Date Due    Pneumococcal 65+ years (2 of 2 - PPSV23) 11[birth year]1988    MEDICARE YEARLY EXAM  06/14/2019    Influenza Age 9 to Adult  08/01/2019

## 2019-10-08 NOTE — ACP (ADVANCE CARE PLANNING)
Advance Care Planning (ACP) Provider Note - Comprehensive     Date of ACP Conversation: 10/07/19  Persons included in Conversation:  patient and family  Length of ACP Conversation in minutes:  16 minutes    Authorized Decision Maker (if patient is incapable of making informed decisions): This person is:  Healthcare Agent/Medical Power of  under Advance Directive            General ACP for ALL Patients with Decision Making Capacity:   Importance of advance care planning, including choosing a healthcare agent to communicate patient's healthcare decisions if patient lost the ability to make decisions, such as after a sudden illness or accident  Understanding of the healthcare agent role was assessed and information provided    Review of Existing Advance Directive:  Does this advance directive still reflect your preferences? Yes (Provide new form/Refer for assistance in updating)    For Serious or Chronic Illness:  Understanding of medical condition      Interventions Provided:  Advised daughter to bring in copy of patient's advanced directive that she has at home so we can upload into her chart.

## 2019-10-08 NOTE — PATIENT INSTRUCTIONS
Medicare Wellness Visit, Female The best way to live healthy is to have a lifestyle where you eat a well-balanced diet, exercise regularly, limit alcohol use, and quit all forms of tobacco/nicotine, if applicable. Regular preventive services are another way to keep healthy. Preventive services (vaccines, screening tests, monitoring & exams) can help personalize your care plan, which helps you manage your own care. Screening tests can find health problems at the earliest stages, when they are easiest to treat. [facility] follows the current, evidence-based guidelines published by the [facility] ([facility]) when recommending preventive services for our patients. Because we follow these guidelines, sometimes recommendations change over time as research supports it. (For example, mammograms used to be recommended annually. Even though Medicare will still pay for an annual mammogram, the newer guidelines recommend a mammogram every two years for women of average risk.) Of course, you and your doctor may decide to screen more often for some diseases, based on your risk and your health status. Preventive services for you include: - Medicare offers their members a free annual wellness visit, which is time for you and your primary care provider to discuss and plan for your preventive service needs. Take advantage of this benefit every year! 
-All adults over the age of 72 should receive the recommended pneumonia vaccines. Current USPSTF guidelines recommend a series of two vaccines for the best pneumonia protection.  
-All adults should have a flu vaccine yearly and a tetanus vaccine every 10 years. All adults age 61 and older should receive a shingles vaccine once in their lifetime.   
-A bone mass density test is recommended when a woman turns 65 to screen for osteoporosis. This test is only recommended one time, as a screening. Some providers will use this same test as a disease monitoring tool if you already have osteoporosis. -All adults age 38-68 who are overweight should have a diabetes screening test once every three years.  
-Other screening tests and preventive services for persons with diabetes include: an eye exam to screen for diabetic retinopathy, a kidney function test, a foot exam, and stricter control over your cholesterol.  
-Cardiovascular screening for adults with routine risk involves an electrocardiogram (ECG) at intervals determined by your doctor.  
-Colorectal cancer screenings should be done for adults age 54-65 with no increased risk factors for colorectal cancer. There are a number of acceptable methods of screening for this type of cancer. Each test has its own benefits and drawbacks. Discuss with your doctor what is most appropriate for you during your annual wellness visit. The different tests include: colonoscopy (considered the best screening method), a fecal occult blood test, a fecal DNA test, and sigmoidoscopy. -Breast cancer screenings are recommended every other year for women of normal risk, age 54-69. 
-Cervical cancer screenings for women over age 72 are only recommended with certain risk factors.  
-All adults born between [facility] should be screened once for Hepatitis C. Here is a list of your current Health Maintenance items (your personalized list of preventive services) with a due date: 
Health Maintenance Due Topic Date Due  Pneumococcal Vaccine (2 of 2 - PPSV23) 11[birth year]1988 [address] [place] [address] Annual Well Visit  06/14/2019

## 2019-10-08 NOTE — PROGRESS NOTES
This is the Subsequent Medicare Annual Wellness Exam, performed 12 months or more after the Initial AWV or the last Subsequent AWV    I have reviewed the patient's medical history in detail and updated the computerized patient record. History     Past Medical History:   Diagnosis Date    Dementia ([address]     Hypercholesterolemia     Hypertension       Past Surgical History:   Procedure Laterality Date    HX CATARACT REMOVAL Left     HX  SECTION       Current Outpatient Medications   Medication Sig Dispense Refill    losartan-hydroCHLOROthiazide (HYZAAR) 100-25 mg per tablet Take 1 Tab by mouth daily. Indications: high blood pressure 90 Tab 1    nystatin (MYCOSTATIN) powder Apply  to affected area four (4) times daily. 60 g 1    nystatin (MYCOSTATIN) topical cream Apply  to affected area two (2) times a day. 30 g 0    nepafenac (ILEVRO) 0.3 % drps Apply 1 Drop to eye daily.  Indications: POSTOPERATIVE OCULAR PAIN, 1 drop left eye daily       Allergies   Allergen Reactions    Aspirin Itching    Pcn [Penicillins] Unable to Obtain     Family History   Problem Relation Age of Onset    Cancer Son     Lung Disease Son      Social History     Tobacco Use    Smoking status: Never Smoker    Smokeless tobacco: Never Used   Substance Use Topics    Alcohol use: No     Patient Active Problem List   Diagnosis Code    HTN, goal below 150/90 I10    Bilateral hearing loss H91.93    Late onset Alzheimer's disease with behavioral disturbance (HCC) G30.1, F02.81    Hyperlipidemia E78.5    TB lung, latent Z22.7    Class 1 obesity due to excess calories with serious comorbidity and body mass index (BMI) of 34.0 to 34.9 in adult E66.09, Z68.34    Primary osteoarthritis involving multiple joints M15.0    Post herpetic neuralgia B02.29       Depression Risk Factor Screening:     3 most recent PHQ Screens 10/3/2019   PHQ Not Done -   Little interest or pleasure in doing things Several days   Feeling down, depressed, irritable, or hopeless Several days   Total Score PHQ 2 2     Alcohol Risk Factor Screening: You do not drink alcohol or very rarely. Functional Ability and Level of Safety:   Hearing Loss  The patient needs further evaluation. Supposed to wear hearing aides, but will not wear them    Activities of Daily Living  The home contains: handrails and grab bars  Patient needs help with:  transportation, preparing meals and housework    Fall Risk  Fall Risk Assessment, last 12 mths 10/3/2019   Able to walk? Yes   Fall in past 12 months? No       Abuse Screen  Patient is not abused    Cognitive Screening   Evaluation of Cognitive Function:  Has your family/caregiver stated any concerns about your memory: yes  Abnormal. Dementia is slightly worsened per patient's daughter    3 most recent PHQ Screens 10/3/2019   PHQ Not Done -   Little interest or pleasure in doing things Several days   Feeling down, depressed, irritable, or hopeless Several days   Total Score PHQ 2 2     Vision could not be completed due to patient's dementia and vision impairment- she actively sees an Ophthalmologist every 6 weeks    Visit Vitals  /63 (BP 1 Location: Right arm, BP Patient Position: Sitting)   Pulse 60   Temp 97.6 °F (36.4 °C) (Oral)   Resp 17   Ht 4' 11\" (1.499 m)   Wt 174 lb (78.9 kg)   SpO2 97%   BMI 35.14 kg/m²     Alert and oriented to person only  RRR  CTAB  No LE edema  Gait stable  Mood pleasant, affect flat    Patient Care Team   Patient Care Team:  Tom Johnson as PCP - General (Physician Assistant)  Florin Velez MD as Physician (Ophthalmology)    Assessment/Plan   Education and counseling provided:  Are appropriate based on today's review and evaluation  End-of-Life planning (with patient's consent)    Diagnoses and all orders for this visit:    1.  Medicare annual wellness visit, subsequent  -     ADVANCE CARE PLANNING FIRST 30 MINS    2. HTN, goal below 150/90  -     losartan-hydroCHLOROthiazide (HYZAAR) 100-25 mg per tablet; Take 1 Tab by mouth daily. Indications: high blood pressure    3. Encounter for immunization  -     INFLUENZA VACCINE INACTIVATED (IIV), SUBUNIT, ADJUVANTED, IM  -     ADMIN INFLUENZA VIRUS VAC    4.  ACP (advance care planning)  -     ADVANCE CARE PLANNING FIRST 30 MINS        Health Maintenance Due   Topic Date Due    Pneumococcal 65+ years (2 of 2 - PPSV23) 11/05/1988

## 2019-11-19 ENCOUNTER — TELEPHONE (OUTPATIENT)
Dept: FAMILY MEDICINE CLINIC | Age: 84
End: 2019-11-19

## 2019-11-19 NOTE — TELEPHONE ENCOUNTER
Patient daughter called requesting some information on services in the area that work directly with Elderly, Dementia patients. Daughter called to let the office know that her mother has been having behavioral, aggressive outbursts with her care giver. Daughter was provided a couple of websites where she can reach out to for additional assistance. Daughter was given:    [employer]  [email or web address]  [employer] to gather additional information.

## 2019-12-03 PROBLEM — R46.89 AGGRESSIVE BEHAVIOR: Status: ACTIVE | Noted: 2019-12-03

## 2019-12-03 PROBLEM — R41.82 AMS (ALTERED MENTAL STATUS): Status: ACTIVE | Noted: 2019-12-03

## 2019-12-07 PROBLEM — Z91.81 RISK FOR FALLS: Status: ACTIVE | Noted: 2019-12-07

## 2019-12-07 PROBLEM — F03.90 DEMENTIA (HCC): Status: ACTIVE | Noted: 2019-12-07

## 2019-12-17 ENCOUNTER — TELEPHONE (OUTPATIENT)
Dept: FAMILY MEDICINE CLINIC | Age: 84
End: 2019-12-17

## 2019-12-17 NOTE — TELEPHONE ENCOUNTER
Rodrigo Styles is a 80 y.o. female  Daughter of pt calling to inform that pt has been having spams ( almost seizure like ) for the last couple of days. Will push hands, teeth chatter, and involuntary movements. Was in the ER last week and put on two new medications ( Trazodone and Lexapro ) with some vitamin supplements. Daughter wondering if these spams are due to the new meds. Pt has not acted out like this before and concerned about this. Pt is stable and in no distress. Overall concern is this is normal for pt on new medications or should pt been assessed for this?   Please advise

## 2019-12-18 ENCOUNTER — HOSPITAL ENCOUNTER (INPATIENT)
Age: 84
LOS: 3 days | Discharge: NURSING, REHAB OR LONG-TERM CARE | DRG: 948 | End: 2019-12-21
Attending: EMERGENCY MEDICINE | Admitting: HOSPITALIST
Payer: MEDICARE

## 2019-12-18 ENCOUNTER — APPOINTMENT (OUTPATIENT)
Dept: CT IMAGING | Age: 84
DRG: 948 | End: 2019-12-18
Attending: STUDENT IN AN ORGANIZED HEALTH CARE EDUCATION/TRAINING PROGRAM
Payer: MEDICARE

## 2019-12-18 ENCOUNTER — APPOINTMENT (OUTPATIENT)
Dept: GENERAL RADIOLOGY | Age: 84
DRG: 948 | End: 2019-12-18
Attending: PHYSICIAN ASSISTANT
Payer: MEDICARE

## 2019-12-18 ENCOUNTER — APPOINTMENT (OUTPATIENT)
Dept: GENERAL RADIOLOGY | Age: 84
DRG: 948 | End: 2019-12-18
Attending: STUDENT IN AN ORGANIZED HEALTH CARE EDUCATION/TRAINING PROGRAM
Payer: MEDICARE

## 2019-12-18 DIAGNOSIS — R41.82 ALTERED MENTAL STATUS, UNSPECIFIED ALTERED MENTAL STATUS TYPE: Primary | ICD-10-CM

## 2019-12-18 LAB
ALBUMIN SERPL-MCNC: 2.8 G/DL (ref 3.4–5)
ALBUMIN/GLOB SERPL: 0.7 {RATIO} (ref 0.8–1.7)
ALP SERPL-CCNC: 117 U/L (ref 45–117)
ALT SERPL-CCNC: 27 U/L (ref 13–56)
ANION GAP SERPL CALC-SCNC: 8 MMOL/L (ref 3–18)
APPEARANCE UR: ABNORMAL
AST SERPL-CCNC: 28 U/L (ref 10–38)
BACTERIA URNS QL MICRO: ABNORMAL /HPF
BASOPHILS # BLD: 0 K/UL (ref 0–0.1)
BASOPHILS NFR BLD: 0 % (ref 0–2)
BILIRUB SERPL-MCNC: 0.6 MG/DL (ref 0.2–1)
BILIRUB UR QL: NEGATIVE
BUN SERPL-MCNC: 46 MG/DL (ref 7–18)
BUN/CREAT SERPL: 34 (ref 12–20)
CALCIUM SERPL-MCNC: 9.1 MG/DL (ref 8.5–10.1)
CHLORIDE SERPL-SCNC: 113 MMOL/L (ref 100–111)
CO2 SERPL-SCNC: 23 MMOL/L (ref 21–32)
COLOR UR: ABNORMAL
CREAT SERPL-MCNC: 1.35 MG/DL (ref 0.6–1.3)
DIFFERENTIAL METHOD BLD: ABNORMAL
EOSINOPHIL # BLD: 0.1 K/UL (ref 0–0.4)
EOSINOPHIL NFR BLD: 1 % (ref 0–5)
EPITH CASTS URNS QL MICRO: ABNORMAL /LPF (ref 0–5)
ERYTHROCYTE [DISTWIDTH] IN BLOOD BY AUTOMATED COUNT: 14.6 % (ref 11.6–14.5)
FLUAV AG NPH QL IA: NEGATIVE
FLUBV AG NOSE QL IA: NEGATIVE
GLOBULIN SER CALC-MCNC: 4.1 G/DL (ref 2–4)
GLUCOSE SERPL-MCNC: 103 MG/DL (ref 74–99)
GLUCOSE UR STRIP.AUTO-MCNC: NEGATIVE MG/DL
HCT VFR BLD AUTO: 40.6 % (ref 35–45)
HGB BLD-MCNC: 13.8 G/DL (ref 12–16)
HGB UR QL STRIP: NEGATIVE
KETONES UR QL STRIP.AUTO: NEGATIVE MG/DL
LACTATE BLD-SCNC: 1.7 MMOL/L (ref 0.4–2)
LACTATE BLD-SCNC: 2.17 MMOL/L (ref 0.4–2)
LEUKOCYTE ESTERASE UR QL STRIP.AUTO: ABNORMAL
LYMPHOCYTES # BLD: 2.1 K/UL (ref 0.9–3.6)
LYMPHOCYTES NFR BLD: 14 % (ref 21–52)
MCH RBC QN AUTO: 28.2 PG (ref 24–34)
MCHC RBC AUTO-ENTMCNC: 34 G/DL (ref 31–37)
MCV RBC AUTO: 83 FL (ref 74–97)
MONOCYTES # BLD: 1.2 K/UL (ref 0.05–1.2)
MONOCYTES NFR BLD: 8 % (ref 3–10)
NEUTS SEG # BLD: 11.4 K/UL (ref 1.8–8)
NEUTS SEG NFR BLD: 77 % (ref 40–73)
NITRITE UR QL STRIP.AUTO: NEGATIVE
PH UR STRIP: 6 [PH] (ref 5–8)
PLATELET # BLD AUTO: 269 K/UL (ref 135–420)
PMV BLD AUTO: 12.1 FL (ref 9.2–11.8)
POTASSIUM SERPL-SCNC: 5.7 MMOL/L (ref 3.5–5.5)
PROT SERPL-MCNC: 6.9 G/DL (ref 6.4–8.2)
PROT UR STRIP-MCNC: NEGATIVE MG/DL
RBC # BLD AUTO: 4.89 M/UL (ref 4.2–5.3)
RBC #/AREA URNS HPF: NEGATIVE /HPF (ref 0–5)
SODIUM SERPL-SCNC: 144 MMOL/L (ref 136–145)
SP GR UR REFRACTOMETRY: 1.02 (ref 1–1.03)
UROBILINOGEN UR QL STRIP.AUTO: 1 EU/DL (ref 0.2–1)
WBC # BLD AUTO: 14.8 K/UL (ref 4.6–13.2)
WBC URNS QL MICRO: ABNORMAL /HPF (ref 0–4)
YEAST URNS QL MICRO: ABNORMAL

## 2019-12-18 PROCEDURE — 81001 URINALYSIS AUTO W/SCOPE: CPT

## 2019-12-18 PROCEDURE — 74011250636 HC RX REV CODE- 250/636: Performed by: STUDENT IN AN ORGANIZED HEALTH CARE EDUCATION/TRAINING PROGRAM

## 2019-12-18 PROCEDURE — 70450 CT HEAD/BRAIN W/O DYE: CPT

## 2019-12-18 PROCEDURE — 96375 TX/PRO/DX INJ NEW DRUG ADDON: CPT

## 2019-12-18 PROCEDURE — 77030038269 HC DRN EXT URIN PURWCK BARD -A

## 2019-12-18 PROCEDURE — 74011000250 HC RX REV CODE- 250: Performed by: STUDENT IN AN ORGANIZED HEALTH CARE EDUCATION/TRAINING PROGRAM

## 2019-12-18 PROCEDURE — 65660000000 HC RM CCU STEPDOWN

## 2019-12-18 PROCEDURE — 71045 X-RAY EXAM CHEST 1 VIEW: CPT

## 2019-12-18 PROCEDURE — 87086 URINE CULTURE/COLONY COUNT: CPT

## 2019-12-18 PROCEDURE — 87040 BLOOD CULTURE FOR BACTERIA: CPT

## 2019-12-18 PROCEDURE — 73521 X-RAY EXAM HIPS BI 2 VIEWS: CPT

## 2019-12-18 PROCEDURE — 80053 COMPREHEN METABOLIC PANEL: CPT

## 2019-12-18 PROCEDURE — 85025 COMPLETE CBC W/AUTO DIFF WBC: CPT

## 2019-12-18 PROCEDURE — 83605 ASSAY OF LACTIC ACID: CPT

## 2019-12-18 PROCEDURE — 87077 CULTURE AEROBIC IDENTIFY: CPT

## 2019-12-18 PROCEDURE — 99285 EMERGENCY DEPT VISIT HI MDM: CPT

## 2019-12-18 PROCEDURE — 74011250636 HC RX REV CODE- 250/636: Performed by: PHYSICIAN ASSISTANT

## 2019-12-18 PROCEDURE — 93005 ELECTROCARDIOGRAM TRACING: CPT

## 2019-12-18 PROCEDURE — 96374 THER/PROPH/DIAG INJ IV PUSH: CPT

## 2019-12-18 PROCEDURE — 87106 FUNGI IDENTIFICATION YEAST: CPT

## 2019-12-18 PROCEDURE — 87186 SC STD MICRODIL/AGAR DIL: CPT

## 2019-12-18 PROCEDURE — 87804 INFLUENZA ASSAY W/OPTIC: CPT

## 2019-12-18 RX ORDER — SODIUM CHLORIDE 0.9 % (FLUSH) 0.9 %
5-10 SYRINGE (ML) INJECTION AS NEEDED
Status: DISCONTINUED | OUTPATIENT
Start: 2019-12-18 | End: 2019-12-21 | Stop reason: HOSPADM

## 2019-12-18 RX ORDER — VANCOMYCIN/0.9 % SOD CHLORIDE 1.5G/250ML
1500 PLASTIC BAG, INJECTION (ML) INTRAVENOUS ONCE
Status: COMPLETED | OUTPATIENT
Start: 2019-12-18 | End: 2019-12-18

## 2019-12-18 RX ADMIN — SODIUM CHLORIDE 1000 ML: 900 INJECTION, SOLUTION INTRAVENOUS at 22:28

## 2019-12-18 RX ADMIN — SODIUM CHLORIDE 1000 ML: 900 INJECTION, SOLUTION INTRAVENOUS at 20:23

## 2019-12-18 RX ADMIN — VANCOMYCIN HYDROCHLORIDE 1500 MG: 10 INJECTION, POWDER, LYOPHILIZED, FOR SOLUTION INTRAVENOUS at 20:07

## 2019-12-18 RX ADMIN — CEFEPIME HYDROCHLORIDE 2 G: 2 INJECTION, POWDER, FOR SOLUTION INTRAVENOUS at 20:07

## 2019-12-18 NOTE — ED TRIAGE NOTES
Pt to ED via EMS from [facility] who states pt has had decreased oral intake and has become increasingly confused. EMS reports that at baseline pt is alert and  Confused ambulatory. Per EMS pt is alert to verbal with incomprehensible sounds.

## 2019-12-18 NOTE — ED NOTES
EMERGENCY DEPARTMENT HISTORY AND PHYSICAL EXAM    6:16 PM      Date: 12/18/2019  Patient Name: Khari uG    History of Presenting Illness     Chief Complaint   Patient presents with    Altered mental status         History Provided By: Patient  Location/Duration/Severity/Modifying factors   HPI    Patient is a 80year old F with PMH of dementia and depression who presents with AMS. Her daughter and son are present at bedside and help to give history. Patient apparently has had decreased appetite since Friday and has become more lethargic today. She has also developed spasms per daugther. Spams are apparently all over her body. Recent medications include trazodone which patient stopped taking about a week ago and switched Celexa for lexapro. Mother states prior to her admission several weeks ago patient was ambulatory but at her baseline is oriented to person only. Daughter states pt had an unwitnessed fall Monday night at [facility]. PCP: Danyelle Nava PA-C    Current Outpatient Medications   Medication Sig Dispense Refill    cyanocobalamin (VITAMIN B12) 2,500 mcg sublingual tablet Take 1 Tab by mouth daily. 100 Tab 0    ergocalciferol (ERGOCALCIFEROL) 50,000 unit capsule Take 1 Cap by mouth every seven (7) days. 4 Cap 3    escitalopram oxalate (LEXAPRO) 10 mg tablet Take 1 Tab by mouth every evening. 30 Tab 0    magnesium oxide (MAG-OX) 400 mg tablet Take 1 Tab by mouth daily. 30 Tab 0    potassium chloride (K-DUR, KLOR-CON) 20 mEq tablet Take 1 Tab by mouth daily. 30 Tab 0    traZODone (DESYREL) 50 mg tablet Take 0.5 Tabs by mouth three (3) times daily as needed (agitation). 20 Tab 0    losartan-hydroCHLOROthiazide (HYZAAR) 100-25 mg per tablet Take 1 Tab by mouth daily. Indications: high blood pressure 90 Tab 1    nystatin (MYCOSTATIN) topical cream Apply  to affected area two (2) times a day. 30 g 0    nepafenac (ILEVRO) 0.3 % drps Apply 1 Drop to eye daily.  Indications: POSTOPERATIVE OCULAR PAIN, 1 drop left eye daily         Past History     Past Medical History:  Past Medical History:   Diagnosis Date    Dementia ([address]     Hypercholesterolemia     Hypertension        Past Surgical History:  Past Surgical History:   Procedure Laterality Date    HX CATARACT REMOVAL Left     HX  SECTION         Family History:  Family History   Problem Relation Age of Onset    Cancer Son     Lung Disease Son        Social History:  Social History     Tobacco Use    Smoking status: Never Smoker    Smokeless tobacco: Never Used   Substance Use Topics    Alcohol use: No    Drug use: No       Allergies: Allergies   Allergen Reactions    Aspirin Itching    Pcn [Penicillins] Unable to Obtain         Review of Systems     Review of Systems   Unable to perform ROS: Dementia   Per EMS and family report patient has had productive cough of clear sputum today. Physical Exam     Visit Vitals  /64 (BP 1 Location: Left arm, BP Patient Position: Sitting)   Pulse 64   Temp 98 °F (36.7 °C)   Resp 18   Ht 4' 11\" (1.499 m)   Wt 77.1 kg (170 lb)   SpO2 99%   BMI 34.34 kg/m²       Physical Exam  Constitutional:       Comments: Confused elderly female with occasional spasms in her upper extremities    HENT:      Head: Normocephalic and atraumatic. Mouth/Throat:      Comments: Mucus membranes are dry    Eyes:      Pupils: Pupils are unequal.   Cardiovascular:      Rate and Rhythm: Normal rate and regular rhythm. Heart sounds: Normal heart sounds. No murmur. No friction rub. No gallop. Pulmonary:      Effort: Pulmonary effort is normal. No respiratory distress. Breath sounds: Normal breath sounds. No stridor. No wheezing or rales. Abdominal:      General: There is no distension. Palpations: Abdomen is soft. Tenderness: There is no tenderness. Musculoskeletal:      Comments: No obvious abnormalities   Lymphadenopathy:      Cervical: No cervical adenopathy.    Skin: General: Skin is warm and dry. Neurological:      Comments: Oriented to person only    Psychiatric:      Comments: Confused            Diagnostic Study Results     Labs -  Recent Results (from the past 12 hour(s))   EKG, 12 LEAD, INITIAL    Collection Time: 12/18/19  5:43 PM   Result Value Ref Range    Ventricular Rate 74 BPM    Atrial Rate 74 BPM    P-R Interval 168 ms    QRS Duration 118 ms    Q-T Interval 426 ms    QTC Calculation (Bezet) 472 ms    Calculated P Axis 44 degrees    Calculated R Axis -14 degrees    Calculated T Axis 102 degrees    Diagnosis       Sinus rhythm with premature atrial complexes with aberrant conduction  Possible Inferior infarct , age undetermined  Anteroseptal infarct , age undetermined  T wave abnormality, consider lateral ischemia  Abnormal ECG  No previous ECGs available         Radiologic Studies -   No orders to display         Medical Decision Making   I am the first provider for this patient. I reviewed the vital signs, available nursing notes, past medical history, past surgical history, family history and social history. Vital Signs-Reviewed the patient's vital signs. EKG: NSR     Records Reviewed: Old Medical Records (Time of Review: 6:16 PM)    ED Course: Progress Notes, Reevaluation, and Consults:     Provider Notes (Medical Decision Making):   MDM  Number of Diagnoses or Management Options  Diagnosis management comments: Patient is a 80year old F with PMH of Dementia, depression, HTN who presents with AMS. Currently at her baseline which is oriented to person. Patient does not appear septic on examination. Fluids 30 cc per kilo and IV abx started given potential for PNA. Sepsis reassessment performed. Given AMS and recent fall CT head ordered. Urinalysis CXR sent. EKG shows normal sinus rhythm. Will check flu. Lab shows leukocytosis and lactic acid to 2.17. CT head negative.  Her CXR is pending final read however there is questionable infiltrate on right middle lobe. UA concerning for UTI. Will continue IVFs and repeat lactic acid. Given concern for AMS in setting of possible infection PNA vs UTI, decision made to admit. Spoke with admitting hospitalist, Dr. Ha Son who accepts patient for admission. Will recheck VS, repeat lactic acid pending, check pelvic xrays as patient had fall Monday and has not ambulated since. Hospitalist will f/u xrays. ATTENDING ATTESTATION:  I personally saw and examined the patient. I have reviewed and agree with the residents findings, including all diagnostic interpretations, and plans as written. I was present during the key portions of separately billed procedures. Farideh Ennis MD            Procedures    Critical Care Time:       Diagnosis     Clinical Impression: No diagnosis found. Disposition: Admission     Follow-up Information    None          Patient's Medications   Start Taking    No medications on file   Continue Taking    CYANOCOBALAMIN (VITAMIN B12) 2,500 MCG SUBLINGUAL TABLET    Take 1 Tab by mouth daily. ERGOCALCIFEROL (ERGOCALCIFEROL) 50,000 UNIT CAPSULE    Take 1 Cap by mouth every seven (7) days. ESCITALOPRAM OXALATE (LEXAPRO) 10 MG TABLET    Take 1 Tab by mouth every evening. LOSARTAN-HYDROCHLOROTHIAZIDE (HYZAAR) 100-25 MG PER TABLET    Take 1 Tab by mouth daily. Indications: high blood pressure    MAGNESIUM OXIDE (MAG-OX) 400 MG TABLET    Take 1 Tab by mouth daily. NEPAFENAC (ILEVRO) 0.3 % DRPS    Apply 1 Drop to eye daily. Indications: POSTOPERATIVE OCULAR PAIN, 1 drop left eye daily    NYSTATIN (MYCOSTATIN) TOPICAL CREAM    Apply  to affected area two (2) times a day. POTASSIUM CHLORIDE (K-DUR, KLOR-CON) 20 MEQ TABLET    Take 1 Tab by mouth daily. TRAZODONE (DESYREL) 50 MG TABLET    Take 0.5 Tabs by mouth three (3) times daily as needed (agitation).    These Medications have changed    No medications on file   Stop Taking    No medications on file     Disclaimer: Sections of this note are dictated using utilizing voice recognition software. Minor typographical errors may be present. If questions arise, please do not hesitate to contact me or call our department.

## 2019-12-18 NOTE — TELEPHONE ENCOUNTER
Patient's daughter called stating that patient's intake has been approx. 4 oz of fluids and no food for the last two days. Daughter is asking for advice on what to do for her mother, should she take her back to the ED, or stop medication, ect. Please advise.

## 2019-12-18 NOTE — PROGRESS NOTES
[facility]- Initial Progress Note    Pharmacy Consult ordered by NONA Duval     Indication: sepsis    Patient clinical status and labs ordered/reviewed. Pt Weight Weight: 77.1 kg (170 lb)   Serum Creatinine Lab Results   Component Value Date/Time    Creatinine 1.1 12/09/2019 04:54 AM       Creatinine Clearance Estimated Creatinine Clearance: 28.4 mL/min (by C-G formula based on SCr of 1.1 mg/dL). BUN Lab Results   Component Value Date/Time    BUN 23 12/09/2019 04:54 AM       WBC Lab Results   Component Value Date/Time    WBC 7.4 12/09/2019 04:54 AM      Temperature Temp: 98 °F (36.7 °C)   HR Pulse (Heart Rate): 64     BP BP: 117/64             Drug Levels:   Vancomycin    No results for input(s): VANCP, VANCT, VANCR, VANRA in the last 72 hours. Gentamicin   No results for input(s): GENP, GENT in the last 72 hours. No lab exists for component:  GENR   Tobramycin   No results for input(s): TOBP, TOBT, TOBR in the last 72 hours. Amikacin   No results for input(s): Jl Go in the last 72 hours.     No lab exists for component:  REGINO Ferguson DAMIKR     Dose for naïve patient was initiated at: Vancomycin 1500mg IV x1, further dosing pending labs/levels    Continue to monitor    Sign: TAMARA Sr [facility]  Date: 12/18/2019  Time: 6:28 PM

## 2019-12-19 ENCOUNTER — APPOINTMENT (OUTPATIENT)
Dept: CT IMAGING | Age: 84
DRG: 948 | End: 2019-12-19
Attending: INTERNAL MEDICINE
Payer: MEDICARE

## 2019-12-19 ENCOUNTER — APPOINTMENT (OUTPATIENT)
Dept: ULTRASOUND IMAGING | Age: 84
DRG: 948 | End: 2019-12-19
Attending: HOSPITALIST
Payer: MEDICARE

## 2019-12-19 ENCOUNTER — APPOINTMENT (OUTPATIENT)
Dept: GENERAL RADIOLOGY | Age: 84
DRG: 948 | End: 2019-12-19
Attending: HOSPITALIST
Payer: MEDICARE

## 2019-12-19 PROBLEM — N17.9 ACUTE KIDNEY INJURY (HCC): Status: ACTIVE | Noted: 2019-12-18

## 2019-12-19 PROBLEM — E87.20 LACTIC ACIDOSIS: Status: ACTIVE | Noted: 2019-12-18

## 2019-12-19 PROBLEM — I10 HTN, GOAL BELOW 150/90: Chronic | Status: ACTIVE | Noted: 2017-09-26

## 2019-12-19 LAB
ANION GAP SERPL CALC-SCNC: 6 MMOL/L (ref 3–18)
ATRIAL RATE: 74 BPM
BUN SERPL-MCNC: 38 MG/DL (ref 7–18)
BUN/CREAT SERPL: 32 (ref 12–20)
CALCIUM SERPL-MCNC: 8.4 MG/DL (ref 8.5–10.1)
CALCULATED P AXIS, ECG09: 44 DEGREES
CALCULATED R AXIS, ECG10: -14 DEGREES
CALCULATED T AXIS, ECG11: 102 DEGREES
CHLORIDE SERPL-SCNC: 119 MMOL/L (ref 100–111)
CO2 SERPL-SCNC: 23 MMOL/L (ref 21–32)
CREAT SERPL-MCNC: 1.19 MG/DL (ref 0.6–1.3)
DIAGNOSIS, 93000: NORMAL
GLUCOSE SERPL-MCNC: 107 MG/DL (ref 74–99)
P-R INTERVAL, ECG05: 168 MS
POTASSIUM SERPL-SCNC: 4.8 MMOL/L (ref 3.5–5.5)
Q-T INTERVAL, ECG07: 426 MS
QRS DURATION, ECG06: 118 MS
QTC CALCULATION (BEZET), ECG08: 472 MS
SODIUM SERPL-SCNC: 148 MMOL/L (ref 136–145)
VANCOMYCIN SERPL-MCNC: 9.2 UG/ML (ref 5–40)
VENTRICULAR RATE, ECG03: 74 BPM

## 2019-12-19 PROCEDURE — 74011250636 HC RX REV CODE- 250/636: Performed by: HOSPITALIST

## 2019-12-19 PROCEDURE — 36415 COLL VENOUS BLD VENIPUNCTURE: CPT

## 2019-12-19 PROCEDURE — 77030038269 HC DRN EXT URIN PURWCK BARD -A

## 2019-12-19 PROCEDURE — 92526 ORAL FUNCTION THERAPY: CPT

## 2019-12-19 PROCEDURE — 74011000250 HC RX REV CODE- 250: Performed by: HOSPITALIST

## 2019-12-19 PROCEDURE — 80048 BASIC METABOLIC PNL TOTAL CA: CPT

## 2019-12-19 PROCEDURE — 76770 US EXAM ABDO BACK WALL COMP: CPT

## 2019-12-19 PROCEDURE — 65660000000 HC RM CCU STEPDOWN

## 2019-12-19 PROCEDURE — 74011250637 HC RX REV CODE- 250/637: Performed by: INTERNAL MEDICINE

## 2019-12-19 PROCEDURE — 74018 RADEX ABDOMEN 1 VIEW: CPT

## 2019-12-19 PROCEDURE — 74011250637 HC RX REV CODE- 250/637: Performed by: HOSPITALIST

## 2019-12-19 PROCEDURE — 74150 CT ABDOMEN W/O CONTRAST: CPT

## 2019-12-19 PROCEDURE — C9113 INJ PANTOPRAZOLE SODIUM, VIA: HCPCS | Performed by: HOSPITALIST

## 2019-12-19 PROCEDURE — 92610 EVALUATE SWALLOWING FUNCTION: CPT

## 2019-12-19 PROCEDURE — 80202 ASSAY OF VANCOMYCIN: CPT

## 2019-12-19 PROCEDURE — 74011250636 HC RX REV CODE- 250/636: Performed by: INTERNAL MEDICINE

## 2019-12-19 RX ORDER — VITAMIN B COMPLEX
2500 TABLET ORAL DAILY
Status: DISCONTINUED | OUTPATIENT
Start: 2019-12-19 | End: 2019-12-21 | Stop reason: HOSPADM

## 2019-12-19 RX ORDER — VANCOMYCIN HYDROCHLORIDE
1250 ONCE
Status: COMPLETED | OUTPATIENT
Start: 2019-12-19 | End: 2019-12-20

## 2019-12-19 RX ORDER — OXYCODONE AND ACETAMINOPHEN 5; 325 MG/1; MG/1
1 TABLET ORAL
Status: DISCONTINUED | OUTPATIENT
Start: 2019-12-19 | End: 2019-12-21 | Stop reason: HOSPADM

## 2019-12-19 RX ORDER — TRAZODONE HYDROCHLORIDE 50 MG/1
25 TABLET ORAL
Status: DISCONTINUED | OUTPATIENT
Start: 2019-12-19 | End: 2019-12-21 | Stop reason: HOSPADM

## 2019-12-19 RX ORDER — SODIUM CHLORIDE, SODIUM LACTATE, POTASSIUM CHLORIDE, CALCIUM CHLORIDE 600; 310; 30; 20 MG/100ML; MG/100ML; MG/100ML; MG/100ML
75 INJECTION, SOLUTION INTRAVENOUS CONTINUOUS
Status: DISCONTINUED | OUTPATIENT
Start: 2019-12-19 | End: 2019-12-21 | Stop reason: HOSPADM

## 2019-12-19 RX ADMIN — SODIUM CHLORIDE 40 MG: 9 INJECTION INTRAMUSCULAR; INTRAVENOUS; SUBCUTANEOUS at 20:29

## 2019-12-19 RX ADMIN — VANCOMYCIN HYDROCHLORIDE 1250 MG: 10 INJECTION, POWDER, LYOPHILIZED, FOR SOLUTION INTRAVENOUS at 23:01

## 2019-12-19 RX ADMIN — OXYCODONE AND ACETAMINOPHEN 1 TABLET: 5; 325 TABLET ORAL at 20:29

## 2019-12-19 RX ADMIN — SODIUM CHLORIDE 40 MG: 9 INJECTION INTRAMUSCULAR; INTRAVENOUS; SUBCUTANEOUS at 09:00

## 2019-12-19 RX ADMIN — OXYCODONE AND ACETAMINOPHEN 1 TABLET: 5; 325 TABLET ORAL at 17:28

## 2019-12-19 RX ADMIN — SODIUM CHLORIDE, SODIUM LACTATE, POTASSIUM CHLORIDE, AND CALCIUM CHLORIDE 75 ML/HR: 600; 310; 30; 20 INJECTION, SOLUTION INTRAVENOUS at 10:37

## 2019-12-19 RX ADMIN — TRAZODONE HYDROCHLORIDE 25 MG: 50 TABLET ORAL at 20:29

## 2019-12-19 RX ADMIN — Medication 2500 MCG: at 10:47

## 2019-12-19 NOTE — PROGRESS NOTES
Problem: Dysphagia (Adult)  Goal: *Acute Goals and Plan of Care (Insert Text)  Description  Patient will:  1. Tolerate PO trials with 0 s/s overt distress in 4/5 trials  2. Utilize compensatory swallow strategies/maneuvers (decrease bite/sip, size/rate, alt. liq/sol) with min cues in 4/5 trials  3. Perform oral-motor/laryngeal exercises to increase oropharyngeal swallow function with min cues  4. Complete an objective swallow study (i.e., MBSS) to assess swallow integrity, r/o aspiration, and determine of safest LRD, min A    Rec:     Soft solid with nectar-thick liquids  Aspiration precautions  HOB >45 during po intake, remain >30 for 30-45 minutes after po   Small bites/sips; alternate liquid/solid with slow feeding rate   Oral care TID  Meds crushed in puree  Assistance with PO as needed   Outcome: Progressing Towards Goal    SPEECH LANGUAGE PATHOLOGY BEDSIDE SWALLOW EVALUATION/TREATMENT    Patient: Shelia Lehman (10 y.o. female)  Date: 12/19/2019  Primary Diagnosis: Altered mental status [R41.82]        Precautions: aspiration     PLOF: As per H&P    ASSESSMENT :  Based on the objective data described below, the patient presents with mild oral and mod pharyngeal dysphagia c/b immediate weak dry coughing and watery eyes post thin liquid trials. Improved tolerance of reg solid, puree, and nectar-thick; however, weak laryngeal elevation noted to palpation. Pt with minimally increased mastication of solids with positive oral clearance. Per family report: pt with decreased appetite and oral intake over the last few days, but she was eating robustly prior to that. Rec initiation of soft solid diet with nectar-thick liquids, aspiration precautions, oral care TID, and meds crushed in puree. Pt may require assistance with Migdalia Christine will continue to follow.      TREATMENT :  Skilled therapy initiated; Educated pt on aspiration precautions and importance of compensatory swallow techniques to decrease aspiration risk (decrease rate of intake & sip/bite size, upright @HOB for all po intake and ~30 minutes after po); verbalized comprehension. Further reviewed and demonstrated thickening instructions with pt's daughter at bedside. Patient will benefit from skilled intervention to address the above impairments. Patient's rehabilitation potential is considered to be Good  Factors which may influence rehabilitation potential include:   [x]            Mental ability/status  [x]            Medical condition     PLAN :  Recommendations and Planned Interventions: See above  Frequency/Duration: Patient will be followed by speech-language pathology 1-2 times per day/3-5 days per week to address goals. Discharge Recommendations: Merlin Deleon and To Be Determined     SUBJECTIVE:   Patient stated I like gingerale if you have any. OBJECTIVE:     Past Medical History:   Diagnosis Date    Dementia ([facility] [address]     Hypercholesterolemia     Hypertension      Past Surgical History:   Procedure Laterality Date    HX CATARACT REMOVAL Left     HX  SECTION       Prior Level of Function/Home Situation: see below  Home Situation  Home Environment: Rehabilitation facility  One/Two Story Residence: Other (Comment)  Living Alone: No  Support Systems: Skilled nursing facility  Patient Expects to be Discharged to[de-identified] Rehabilitation facility  Current DME Used/Available at Home: Cane, straight    Diet prior to admission: reg solid with thin  Current Diet:  soft solid with nectar-thick     Cognitive and Communication Status:  Neurologic State: Alert  Orientation Level: Oriented to person, Oriented to place, Disoriented to time, Disoriented to situation  Cognition: Follows commands  Oral Assessment:  Oral Assessment  Labial: No impairment  Dentition: Natural;Intact  Oral Hygiene: adequate  Lingual: No impairment  Velum: No impairment  Mandible: No impairment  P.O.  Trials:  Patient Position: [facility] at [facility]  Vocal quality prior to P.O.: No impairment  Consistency Presented: Thin liquid; Solid; Nectar thick liquid;Puree  How Presented: Self-fed/presented;Cup/sip;Spoon;SLP-fed/presented;Straw  Bolus Acceptance: No impairment  Bolus Formation/Control: Impaired  Type of Impairment: Delayed;Mastication  Propulsion: No impairment  Oral Residue: None  Initiation of Swallow: No impairment  Laryngeal Elevation: Weak  Aspiration Signs/Symptoms: Weak cough; Watery eyes  Pharyngeal Phase Characteristics: Suspected pharyngeal residue;Poor endurance  Effective Modifications: Small sips and bites; Alternate liquids/solids  Cues for Modifications: Moderate     Oral Phase Severity: Mild  Pharyngeal Phase Severity : Moderate    PAIN:  Start of Eval: 0  End of Eval: 0     After treatment:   []            Patient left in no apparent distress sitting up in chair  [x]            Patient left in no apparent distress in bed  [x]            Call bell left within reach  [x]            Nursing notified  []            Family present  []            Caregiver present  []            Bed alarm activated    COMMUNICATION/EDUCATION:   [x]            Aspiration precautions; swallow safety; compensatory techniques. [x]            Patient/family have participated as able in goal setting and plan of care. [x]         Posted safety precautions in patient's room.     Thank you for this referral.    Sumi Perez M.S. CCC-SLP/L  Speech-Language Pathologist

## 2019-12-19 NOTE — PROGRESS NOTES
Patient has been seen and evaluated in the emergency room discussed admission with the ER physician, Dr Tu Joshi. I have accepted the patient and placed essential orders.  Full H&P note to follow            \

## 2019-12-19 NOTE — H&P
History & Physical    Patient: Lisa Campbell MRN: [id number]  CSN: [id number]    YOB: 1923  Age: 80 y.o. Sex: female      DOA: 2019       HPI:     Lisa Campbell is a 80 y.o. female who resides at [facility]. She reportedly had rolled out of bed on 2019 with no apparent injuries at that time. Her daughter noted she has had a decreased appetite and could hardly get any fluids in order and on 2019 patient presented to [facility] emergency room. In the ED her lactic acid was 2.17 , WBC 14.8 BUN 38, creatinine 1.19, GFR 51 sodium 4.8 CT of the head was unremarkable for acute intracranial pathology. Chest x-ray and x-ray of both hips were unremarkable. During my evaluation the patient had intermittent episodes of frowning with deep forehead crevices and stiffening and flexion of her lower arms around her abdomen as though she were in discomfort  Patient is admitted to telemetry for acute mental status change, acute kidney injury, lactic acidosis and hypertension and further medical evaluation    Past Medical History:   Diagnosis Date    Dementia ([address]     Hypercholesterolemia     Hypertension        Past Surgical History:   Procedure Laterality Date    HX CATARACT REMOVAL Left     HX  SECTION         Family History   Problem Relation Age of Onset    Cancer Son     Lung Disease Son        Social History     Socioeconomic History    Marital status:      Spouse name: Not on file    Number of children: Not on file    Years of education: Not on file    Highest education level: Not on file   Tobacco Use    Smoking status: Never Smoker    Smokeless tobacco: Never Used   Substance and Sexual Activity    Alcohol use: No    Drug use: No       Prior to Admission medications    Medication Sig Start Date End Date Taking? Authorizing Provider   cyanocobalamin (VITAMIN B12) 2,500 mcg sublingual tablet Take 1 Tab by mouth daily.  12/10/19   Becki Josie Brenner MD   ergocalciferol (ERGOCALCIFEROL) 50,000 unit capsule Take 1 Cap by mouth every seven (7) days. 12/15/19   Herminia Guzman MD   escitalopram oxalate (LEXAPRO) 10 mg tablet Take 1 Tab by mouth every evening. 12/9/19   Herminia Guzman MD   magnesium oxide (MAG-OX) 400 mg tablet Take 1 Tab by mouth daily. 12/9/19   Herminia Guzman MD   potassium chloride (K-DUR, KLOR-CON) 20 mEq tablet Take 1 Tab by mouth daily. 12/10/19   Herminia Guzman MD   traZODone (DESYREL) 50 mg tablet Take 0.5 Tabs by mouth three (3) times daily as needed (agitation). 12/5/19   Gracie Lim MD   losartan-hydroCHLOROthiazide [facility]) 100-25 mg per tablet Take 1 Tab by mouth daily. Indications: high blood pressure 10/3/19   Ricardo Byrnes PA-C   nystatin (MYCOSTATIN) topical cream Apply  to affected area two (2) times a day. 10/25/18   Ricardo Byrnes PA-C   nepafenac (ILEVRO) 0.3 % drps Apply 1 Drop to eye daily. Indications: POSTOPERATIVE OCULAR PAIN, 1 drop left eye daily    Provider, Historical       Allergies   Allergen Reactions    Aspirin Itching    Pcn [Penicillins] Unable to Obtain     . Review of Systems  A 12 point review of systems was performed with the patient's daughter and son-in-law present and medical records and is unremarkable except as detailed in HPI. In addition the patient's daughter states she has been on Desyrel 25 mg 3 times daily for years and was switched to Lexapro and melatonin after this last hospital stay.     Physical Exam:      Visit Vitals  /71 (BP 1 Location: Right arm, BP Patient Position: At rest)   Pulse 75   Temp 97.8 °F (36.6 °C)   Resp 22   Ht 4' 11\" (1.499 m)   Wt 77.1 kg (170 lb)   SpO2 98%   Breastfeeding No   BMI 34.34 kg/m²       Physical Exam:  GENERAL: fatigued, mild distress, slowed mentation, confused  Patient had witnessed episodes of apparent pain: Flexing her arms and wrists around her abdomen while frowning, during my exam  HEENT conjunctiva are clear, anicteric, pharynx clear, lips dry  Chest heart rate regular 75 no murmurs detected  Lungs decreased at the bases clear anteriorly with good airflow  Intermittent dry cough  Abdomen doughy, bowel sounds positive, no bruit detected  No guarding, no focal tenderness or rebound  Extremities calves are nontender, feet are warm, positive pulses  Generalized muscle weakness, moving all 4 extremities in bed    Lab/Data Review:  Labs: Results:       Chemistry Recent Labs     12/18/19 1853   *      K 5.7*   *   CO2 23   BUN 46*   CREA 1.35*   CA 9.1   AGAP 8   BUCR 34*      TP 6.9   ALB 2.8*   GLOB 4.1*   AGRAT 0.7*      CBC w/Diff Recent Labs     12/18/19 1853   WBC 14.8*   RBC 4.89   HGB 13.8   HCT 40.6      GRANS 77*   LYMPH 14*   EOS 1      Coagulation No results for input(s): PTP, INR, APTT, INREXT in the last 72 hours. Iron/Ferritin No results for input(s): IRON in the last 72 hours. No lab exists for component: TIBCCALC   BNP No results for input(s): BNPP in the last 72 hours. Cardiac Enzymes No results for input(s): CPK, CKND1, MARIANO in the last 72 hours. No lab exists for component: CKRMB, TROIP   Liver Enzymes Recent Labs     12/18/19 1853   TP 6.9   ALB 2.8*      SGOT 28      Thyroid Studies Lab Results   Component Value Date/Time    TSH 3.860 (H) 12/09/2019 04:54 AM          All Micro Results     Procedure Component Value Units Date/Time    INFLUENZA A & B AG (RAPID TEST) [[id number]] Collected:  12/18/19 2223    Order Status:  Completed Specimen:  Nasopharyngeal from Nasal washing Updated:  12/18/19 2243     Influenza A Antigen NEGATIVE         Comment: A negative result does not exclude influenza virus infection, seasonal or H1N1 due to suboptimal sensitivity. If influenza is circulating in your community, a diagnosis of influenza should be considered based on a patients clinical presentation and empiric antiviral treatment should be considered, if indicated.         Influenza B Antigen NEGATIVE        CULTURE, URINE [[id number]] Collected:  12/18/19 2004    Order Status:  Completed Specimen:  Cath Urine Updated:  12/18/19 2200    CULTURE, BLOOD [[id number]] Collected:  12/18/19 1840    Order Status:  Completed Specimen:  Blood Updated:  12/18/19 1941    CULTURE, BLOOD [[id number]] Collected:  12/18/19 1900    Order Status:  Completed Specimen:  Blood Updated:  12/18/19 1941          Imaging Reviewed:  CT Results (most recent):  Results from Hospital Encounter encounter on 12/18/19   CT HEAD WO CONT    Narrative CT brain without enhancement     CPT code: 54664    Indication: Confusion and delirium. Technique: Unenhanced 5 mm collimation axial images obtained from the skull base  through the vertex. Dose reduction techniques used: Automated exposure control, adjustment of the  mAs and/or kVp according to patient size, standardized low-dose protocol, and/or  iterative reconstruction technique. Comparison: None. Findings: There is no intracranial hemorrhage. There is no evidence for mass. Moderate cortical atrophy is present with compensatory ventricular dilatation. The gray-white matter differentiation is acutely preserved. There are moderate  white matter hypodensities. No extra-axial fluid collections. The ventricles  are symmetric and midline in position. Vascular structures are symmetric in  attenuation. Basilar cisterns are patent. Sinuses: Clear. Orbits: Normal.  Calvarium:Normal.      Impression Impression:    1. No acute intracranial pathology.     2. Moderate burden of presumed chronic small vessel ischemic disease and volume  loss       Assessment:   Active Problems:    Altered mental status (12/18/2019)       Acute kidney injury        Lactic acidosis         Hypertension    Plan:   Admit to telemetry  Parenteral fluids  Will stop Lexapro as patient has only had a few doses   coincident to symptoms  Restart the trazodone   Check KUB  Check an ultrasound of the retroperitoneum for possible hydro-or renal lithiasis    DNR      Kirit Sweeney DO  12/18/2019, 11:23 PM

## 2019-12-19 NOTE — ED NOTES
Bedside and verbal shift report given by Deven Dockery RN (off going nurse) to Carmita Johnson RN (oncoming nurse). Report included the following information SBAR and ED Summary.

## 2019-12-19 NOTE — ED NOTES
TRANSFER - OUT REPORT:    Verbal report given to [facility], RN on Luis Navarrete  being transferred to [facility] for routine progression of care       Report consisted of patients Situation, Background, Assessment and   Recommendations(SBAR). Information from the following report(s) SBAR, ED Summary and MAR was reviewed with the receiving nurse. Lines:   Peripheral IV 12/18/19 Left Wrist (Active)   Site Assessment Clean, dry, & intact 12/18/2019  7:01 PM   Phlebitis Assessment 0 12/18/2019  7:01 PM   Dressing Status Clean, dry, & intact 12/18/2019  7:01 PM   Dressing Type 4 X 4;Tape;Transparent 12/18/2019  7:01 PM   Hub Color/Line Status Blue;Flushed;Patent 12/18/2019  7:01 PM   Action Taken Blood drawn 12/18/2019  7:01 PM       Peripheral IV 12/18/19 Left Hand (Active)   Site Assessment Clean, dry, & intact 12/18/2019 10:40 PM   Phlebitis Assessment 0 12/18/2019 10:40 PM   Infiltration Assessment 0 12/18/2019 10:40 PM   Dressing Status Clean, dry, & intact; New 12/18/2019 10:40 PM   Dressing Type Transparent 12/18/2019 10:40 PM   Hub Color/Line Status Yellow;Patent; Flushed 12/18/2019 10:40 PM        Opportunity for questions and clarification was provided.       Patient transported with:   [facility]

## 2019-12-19 NOTE — PROGRESS NOTES
Problem: Falls - Risk of  Goal: *Absence of Falls  Description  Document Daquan Maldonado Fall Risk and appropriate interventions in the flowsheet. Outcome: Progressing Towards Goal  Note: Fall Risk Interventions:  Mobility Interventions: Assess mobility with egress test, Bed/chair exit alarm, Communicate number of staff needed for ambulation/transfer, OT consult for ADLs, Patient to call before getting OOB, PT Consult for mobility concerns    Mentation Interventions: Adequate sleep, hydration, pain control, Bed/chair exit alarm, Door open when patient unattended, Evaluate medications/consider consulting pharmacy, More frequent rounding, Reorient patient, Room close to nurse's station, Update white board, Toileting rounds    Medication Interventions: Bed/chair exit alarm, Evaluate medications/consider consulting pharmacy, Patient to call before getting OOB, Teach patient to arise slowly    Elimination Interventions: Bed/chair exit alarm, Call light in reach, Patient to call for help with toileting needs, Stay With Me (per policy), Toilet paper/wipes in reach, Toileting schedule/hourly rounds    History of Falls Interventions: Bed/chair exit alarm, Consult care management for discharge planning, Door open when patient unattended, Evaluate medications/consider consulting pharmacy, Room close to nurse's station, Assess for delayed presentation/identification of injury for 48 hrs (comment for end date)         Problem: Pressure Injury - Risk of  Goal: *Prevention of pressure injury  Description  Document Aiden Scale and appropriate interventions in the flowsheet.   Outcome: Progressing Towards Goal  Note: Pressure Injury Interventions:  Sensory Interventions: Assess changes in LOC, Assess need for specialty bed, Avoid rigorous massage over bony prominences, Check visual cues for pain, Discuss PT/OT consult with provider, Float heels, Keep linens dry and wrinkle-free, Maintain/enhance activity level, Minimize linen layers, Monitor skin under medical devices, Pressure redistribution bed/mattress (bed type), Turn and reposition approx. every two hours (pillows and wedges if needed)    Moisture Interventions: Absorbent underpads, Apply protective barrier, creams and emollients, Assess need for specialty bed, Internal/External urinary devices, Check for incontinence Q2 hours and as needed, Limit adult briefs, Maintain skin hydration (lotion/cream), Minimize layers, Moisture barrier    Activity Interventions: Assess need for specialty bed, Pressure redistribution bed/mattress(bed type), PT/OT evaluation    Mobility Interventions: Assess need for specialty bed, Float heels, Pressure redistribution bed/mattress (bed type), HOB 30 degrees or less, PT/OT evaluation, Turn and reposition approx. every two hours(pillow and wedges)    Nutrition Interventions: Document food/fluid/supplement intake, Discuss nutritional consult with provider, Offer support with meals,snacks and hydration    Friction and Shear Interventions: Feet elevated on foot rest, Apply protective barrier, creams and emollients, Foam dressings/transparent film/skin sealants, HOB 30 degrees or less, Lift team/patient mobility team, Minimize layers                Problem: Pressure Injury - Risk of  Goal: *Prevention of pressure injury  Description  Document Aiden Scale and appropriate interventions in the flowsheet. Outcome: Progressing Towards Goal  Note: Pressure Injury Interventions:  Sensory Interventions: Assess changes in LOC, Assess need for specialty bed, Avoid rigorous massage over bony prominences, Check visual cues for pain, Discuss PT/OT consult with provider, Float heels, Keep linens dry and wrinkle-free, Maintain/enhance activity level, Minimize linen layers, Monitor skin under medical devices, Pressure redistribution bed/mattress (bed type), Turn and reposition approx.  every two hours (pillows and wedges if needed)    Moisture Interventions: Absorbent underpads, Apply protective barrier, creams and emollients, Assess need for specialty bed, Internal/External urinary devices, Check for incontinence Q2 hours and as needed, Limit adult briefs, Maintain skin hydration (lotion/cream), Minimize layers, Moisture barrier    Activity Interventions: Assess need for specialty bed, Pressure redistribution bed/mattress(bed type), PT/OT evaluation    Mobility Interventions: Assess need for specialty bed, Float heels, Pressure redistribution bed/mattress (bed type), HOB 30 degrees or less, PT/OT evaluation, Turn and reposition approx.  every two hours(pillow and wedges)    Nutrition Interventions: Document food/fluid/supplement intake, Discuss nutritional consult with provider, Offer support with meals,snacks and hydration    Friction and Shear Interventions: Feet elevated on foot rest, Apply protective barrier, creams and emollients, Foam dressings/transparent film/skin sealants, HOB 30 degrees or less, Lift team/patient mobility team, Minimize layers                Problem: Pain  Goal: *Control of Pain  Outcome: Progressing Towards Goal

## 2019-12-19 NOTE — PROGRESS NOTES
Admit Date: 12/18/2019  Date of Service: 12/19/2019    Reason for follow-up: Altered mental status      Assessment:         Altered mental status: The patient has become more somnolent with decreased p.o. intake over the last 2 to 3 weeks. CT of the head without any infarcts. Abdominal pain: KUB with nonobstructive bowel gas pattern, abdominal ultrasound is unremarkable, pelvic x-ray without any fractures. Started on Protonix for possible GERD  hyperkalemia: Resolved  Mild hypernatremia: Suspect related to dehydration  Mild leukocytosis: No hypertension no tachycardia no hypoxia, no fever, blood cultures no growth to date, urine culture no growth to date; influenza negative; chest x-ray without infiltrates  Acute on chronic kidney disease stage II: Exacerbated by dehydration  Elevated lactate without lactic acidosis: Suspect due to hypoperfusion from dehydration  Plan:   Discontinue cefepime and vancomycin  Continue Protonix  Follow-up 12/18/2019 blood cultures x2 sets thus far negative  Follow-up 12/18/2019 urine culture thus far negative  Continue IV fluids at 75 cc/h  CBC BMP in a.m. Check CT of the abdomen  Will consider neurology consult  Speech evaluation for possible aspiration   - soft solid diet with nectar-thick liquids, aspiration precautions, oral care TID, and meds crushed in puree. Aspiration precautions    Current Antibtiocs:   Cefepime and vancomycin  Lines:   Peripheral    Case discussed with:  [x]Patient  [x]Family  [x]Nursing  []Case Management  DVT Prophylaxis:  []Lovenox  []Hep SQ  []SCDs  []Coumadin   []On Heparin gtt    I have independently examined the patient and reviewed all lab studies and imgaing as well as review of nursing notes and physican notes from the past 24 hours. Emry Schlatter D.O. Pager [phone]      Allergies   Allergen Reactions    Aspirin Itching    Pcn [Penicillins] Unable to Obtain           Subjective:      Pt seen and examined.   Daughter, speech therapy and nursing are at bedside. The patient is currently sleeping but wakes up easily. The patient answers with one-word responses. She often repeats a single word that was in the question which was asked to her. She is unable to tell me if she is having any pain but does grimace and localize when her abdomen is palpated. Her daughter notes that she is more somnolent than usual and she has been this way since she was discharged from [facility] earlier in December. Objective:        Visit Vitals  /70 (BP 1 Location: Right arm, BP Patient Position: At rest)   Pulse (!) 59   Temp 98 °F (36.7 °C)   Resp 18   Ht 4' 11\" (1.499 m)   Wt 76.9 kg (169 lb 8 oz)   SpO2 93%   Breastfeeding No   BMI 34.23 kg/m²     Temp (24hrs), Av.8 °F (36.6 °C), Min:97.4 °F (36.3 °C), Max:98.8 °F (37.1 °C)        General:   oriented to person. Very drowsy and difficult to awaken. Appears her stated age   Skin:   no rashes or skin lesions noted on limited exam, dry and warm   HEENT:  No scleral icterus or pallor; oral mucosa moist, lips moist   Lymph Nodes:   not assessed today   Lungs:   non, labored; bilaterally clear to aspiration- no crackles wheezes rales or rhonchi   Heart:  RRR, s1 and s2; no murmurs rubs or gallops; no edema, + pedal pulses   Abdomen:  soft, non-distended, active bowel sounds, non-tender   Genitourinary:  deferred   Extremities:   average muscle tone; no contractures, no joint effusions   Neurologic:  No gross focal motor or sensory abnormalities; CN 2-12 intact; Follows commands. Moves extremities independently. Psychiatric:   appropriate and interactive.        Labs: Results:   Chemistry Recent Labs     19  0231 19  1853   * 103*   * 144   K 4.8 5.7*   * 113*   CO2 23 23   BUN 38* 46*   CREA 1.19 1.35*   CA 8.4* 9.1   AGAP 6 8   BUCR 32* 34*   AP  --  117   TP  --  6.9   ALB  --  2.8*   GLOB  --  4.1*   AGRAT  --  0.7*      CBC w/Diff Recent Labs     19  8717 WBC 14.8*   RBC 4.89   HGB 13.8   HCT 40.6      GRANS 77*   LYMPH 14*   EOS 1        No results found for: SDES Lab Results   Component Value Date/Time    Culture result: NO GROWTH AFTER 13 HOURS 12/18/2019 08:04 PM    Culture result: NO GROWTH AFTER 12 HOURS 12/18/2019 07:00 PM    Culture result: NO GROWTH AFTER 12 HOURS 12/18/2019 06:40 PM        Results     Procedure Component Value Units Date/Time    INFLUENZA A & B AG (RAPID TEST) [[id number]] Collected:  12/18/19 2223    Order Status:  Completed Specimen:  Nasopharyngeal from Nasal washing Updated:  12/18/19 2243     Influenza A Antigen NEGATIVE         Comment: A negative result does not exclude influenza virus infection, seasonal or H1N1 due to suboptimal sensitivity. If influenza is circulating in your community, a diagnosis of influenza should be considered based on a patients clinical presentation and empiric antiviral treatment should be considered, if indicated. Influenza B Antigen NEGATIVE        CULTURE, URINE [[id number]] Collected:  12/18/19 2004    Order Status:  Completed Specimen:  Cath Urine Updated:  12/19/19 1157     Special Requests: NO SPECIAL REQUESTS        Culture result: NO GROWTH AFTER 13 HOURS       CULTURE, BLOOD [[id number]] Collected:  12/18/19 1900    Order Status:  Completed Specimen:  Blood Updated:  12/19/19 0829     Special Requests: NO SPECIAL REQUESTS        Culture result: NO GROWTH AFTER 12 HOURS       CULTURE, BLOOD [[id number]] Collected:  12/18/19 1840    Order Status:  Completed Specimen:  Blood Updated:  12/19/19 0829     Special Requests: NO SPECIAL REQUESTS        Culture result: NO GROWTH AFTER 12 HOURS       CULTURE, URINE [[id number]]     Order Status:  No result Specimen:  Cath Urine           Imaging:     Xr Abd (kub)    Result Date: 12/19/2019  IMPRESSION: Nonspecific and nonobstructive bowel gas pattern. Ct Head Wo Cont    Result Date: 12/18/2019  Impression: 1. No acute intracranial pathology. 2. Moderate burden of presumed chronic small vessel ischemic disease and volume loss    Us Retroperitoneum Comp    Result Date: 12/19/2019  IMPRESSION:  Small bilateral kidneys. Otherwise, unremarkable renal ultrasound examination. Xr Chest Port    Result Date: 12/18/2019  IMPRESSION: Cardiomegaly and venous congestion without overt failure    Xr Hips Bi W Ap Pelv    Result Date: 12/18/2019  Impression: 1. No radiographically apparent acute fracture or dislocation. If clinical concern remains high, cross-sectional imaging should be obtained.

## 2019-12-19 NOTE — ROUTINE PROCESS
Bedside and Verbal shift change report given to [employer] (oncoming nurse) by Lacie Hsieh RN 
(offgoing nurse).  Report included the following information Kardex, Intake/Output, MAR, Recent Results and Cardiac Rhythm SR.

## 2019-12-19 NOTE — PROGRESS NOTES
conducted an initial consultation and Spiritual Assessment for Tommi Cushing, who is a 80 y.o.,female. Patients Primary Language is: [place]. According to the patients EMR Holiness Affiliation is: [employer] day [employer].     The reason the Patient came to the hospital is:   Patient Active Problem List    Diagnosis Date Noted    Altered mental status 12/18/2019    Lactic acidosis 12/18/2019    Acute kidney injury ([address] 12/18/2019    Dementia ([address] 12/07/2019    Risk for falls 12/07/2019    Aggressive behavior 12/03/2019    AMS (altered mental status) 12/03/2019    Class 1 obesity due to excess calories with serious comorbidity and body mass index (BMI) of 34.0 to 34.9 in adult 04/25/2018    Primary osteoarthritis involving multiple joints 04/25/2018    Post herpetic neuralgia 04/25/2018    Hyperlipidemia 02/16/2018    TB lung, latent 02/16/2018    HTN, goal below 150/90 09/26/2017    Bilateral hearing loss 09/26/2017    Late onset Alzheimer's disease with behavioral disturbance ([facility] [address] 09/26/2017        The  provided the following Interventions:  Initiated a relationship of care and support. Explored issues of chana, belief, spirituality and Restoration/ritual needs while hospitalized. Listened empathically. Provided chaplaincy education. Provided information about Spiritual Care Services. Offered prayer and assurance of continued prayers on patient's behalf. Chart reviewed. The following outcomes where achieved:  Patient shared limited information about both their medical narrative and spiritual journey/beliefs. Patient processed feeling about current hospitalization. Patient expressed gratitude for 's visit. Assessment:  Patient does not have any Restoration/cultural needs that will affect patients preferences in health care. There are no spiritual or Restoration issues which require intervention at this time.      Plan:  Chaplains will continue to follow and will provide pastoral care on an as needed/requested basis.  recommends bedside caregivers page  on duty if patient shows signs of acute spiritual or emotional distress.     [id number] [facility]   [phone]

## 2019-12-20 ENCOUNTER — APPOINTMENT (OUTPATIENT)
Dept: GENERAL RADIOLOGY | Age: 84
DRG: 948 | End: 2019-12-20
Attending: INTERNAL MEDICINE
Payer: MEDICARE

## 2019-12-20 LAB
ANION GAP SERPL CALC-SCNC: 5 MMOL/L (ref 3–18)
BUN SERPL-MCNC: 28 MG/DL (ref 7–18)
BUN/CREAT SERPL: 26 (ref 12–20)
CALCIUM SERPL-MCNC: 8.7 MG/DL (ref 8.5–10.1)
CHLORIDE SERPL-SCNC: 118 MMOL/L (ref 100–111)
CO2 SERPL-SCNC: 24 MMOL/L (ref 21–32)
CREAT SERPL-MCNC: 1.06 MG/DL (ref 0.6–1.3)
ERYTHROCYTE [DISTWIDTH] IN BLOOD BY AUTOMATED COUNT: 14.7 % (ref 11.6–14.5)
GLUCOSE SERPL-MCNC: 93 MG/DL (ref 74–99)
HCT VFR BLD AUTO: 37.3 % (ref 35–45)
HGB BLD-MCNC: 12.1 G/DL (ref 12–16)
MCH RBC QN AUTO: 27.6 PG (ref 24–34)
MCHC RBC AUTO-ENTMCNC: 32.4 G/DL (ref 31–37)
MCV RBC AUTO: 85 FL (ref 74–97)
PLATELET # BLD AUTO: 229 K/UL (ref 135–420)
PMV BLD AUTO: 11.8 FL (ref 9.2–11.8)
POTASSIUM SERPL-SCNC: 4.9 MMOL/L (ref 3.5–5.5)
RBC # BLD AUTO: 4.39 M/UL (ref 4.2–5.3)
SODIUM SERPL-SCNC: 147 MMOL/L (ref 136–145)
WBC # BLD AUTO: 11.2 K/UL (ref 4.6–13.2)

## 2019-12-20 PROCEDURE — 74011250637 HC RX REV CODE- 250/637: Performed by: INTERNAL MEDICINE

## 2019-12-20 PROCEDURE — 36415 COLL VENOUS BLD VENIPUNCTURE: CPT

## 2019-12-20 PROCEDURE — 92526 ORAL FUNCTION THERAPY: CPT

## 2019-12-20 PROCEDURE — 85027 COMPLETE CBC AUTOMATED: CPT

## 2019-12-20 PROCEDURE — 74230 X-RAY XM SWLNG FUNCJ C+: CPT

## 2019-12-20 PROCEDURE — 97162 PT EVAL MOD COMPLEX 30 MIN: CPT

## 2019-12-20 PROCEDURE — 74011000255 HC RX REV CODE- 255: Performed by: INTERNAL MEDICINE

## 2019-12-20 PROCEDURE — 97535 SELF CARE MNGMENT TRAINING: CPT

## 2019-12-20 PROCEDURE — 97166 OT EVAL MOD COMPLEX 45 MIN: CPT

## 2019-12-20 PROCEDURE — C9113 INJ PANTOPRAZOLE SODIUM, VIA: HCPCS | Performed by: HOSPITALIST

## 2019-12-20 PROCEDURE — 74011250637 HC RX REV CODE- 250/637: Performed by: HOSPITALIST

## 2019-12-20 PROCEDURE — 74011250636 HC RX REV CODE- 250/636: Performed by: HOSPITALIST

## 2019-12-20 PROCEDURE — 65660000000 HC RM CCU STEPDOWN

## 2019-12-20 PROCEDURE — 80048 BASIC METABOLIC PNL TOTAL CA: CPT

## 2019-12-20 PROCEDURE — 92611 MOTION FLUOROSCOPY/SWALLOW: CPT

## 2019-12-20 PROCEDURE — 74011000250 HC RX REV CODE- 250: Performed by: HOSPITALIST

## 2019-12-20 PROCEDURE — 74011250637 HC RX REV CODE- 250/637

## 2019-12-20 RX ORDER — FLUCONAZOLE 40 MG/ML
160 POWDER, FOR SUSPENSION ORAL ONCE
Status: COMPLETED | OUTPATIENT
Start: 2019-12-20 | End: 2019-12-20

## 2019-12-20 RX ADMIN — BARIUM SULFATE 15 ML: 400 SUSPENSION ORAL at 13:26

## 2019-12-20 RX ADMIN — FLUCONAZOLE 160 MG: 40 POWDER, FOR SUSPENSION ORAL at 15:49

## 2019-12-20 RX ADMIN — SODIUM CHLORIDE, SODIUM LACTATE, POTASSIUM CHLORIDE, AND CALCIUM CHLORIDE 75 ML/HR: 600; 310; 30; 20 INJECTION, SOLUTION INTRAVENOUS at 21:42

## 2019-12-20 RX ADMIN — SODIUM CHLORIDE 40 MG: 9 INJECTION INTRAMUSCULAR; INTRAVENOUS; SUBCUTANEOUS at 08:59

## 2019-12-20 RX ADMIN — SODIUM CHLORIDE, SODIUM LACTATE, POTASSIUM CHLORIDE, AND CALCIUM CHLORIDE 75 ML/HR: 600; 310; 30; 20 INJECTION, SOLUTION INTRAVENOUS at 04:56

## 2019-12-20 RX ADMIN — TRAZODONE HYDROCHLORIDE 25 MG: 50 TABLET ORAL at 20:21

## 2019-12-20 RX ADMIN — SODIUM CHLORIDE 40 MG: 9 INJECTION INTRAMUSCULAR; INTRAVENOUS; SUBCUTANEOUS at 20:20

## 2019-12-20 RX ADMIN — BARIUM SULFATE 30 ML: 400 PASTE ORAL at 13:26

## 2019-12-20 RX ADMIN — Medication 2500 MCG: at 08:59

## 2019-12-20 RX ADMIN — OXYCODONE AND ACETAMINOPHEN 1 TABLET: 5; 325 TABLET ORAL at 21:36

## 2019-12-20 RX ADMIN — OXYCODONE AND ACETAMINOPHEN 1 TABLET: 5; 325 TABLET ORAL at 15:36

## 2019-12-20 NOTE — PROGRESS NOTES
Discharge planning:    Patient will likely discharge from [facility] today (12/20/2019) and return to [address] [place] [address]. Per Dr. Wilbert Pascual, she is planning to discharge patient back to the SNF today. Patient will need stretcher transport, at the time of discharge. This writer will continue to monitor for discharge orders and planning. Gill De La Fuente [facility]  Care Manager  Pager#: [phone]

## 2019-12-20 NOTE — PROGRESS NOTES
Problem: Mobility Impaired (Adult and Pediatric)  Goal: *Acute Goals and Plan of Care (Insert Text)  Description  Physical Therapy Goals  Initiated 12/20/2019 and to be accomplished within 7 day(s)  1. Patient will move from supine to sit and sit to supine , scoot up and down and roll side to side in bed with minimal assistance/contact guard assist.     2.  Patient will transfer from bed to chair and chair to bed with minimal assistance/contact guard assist using the least restrictive device. 3.  Patient will perform sit to stand with minimal assistance/contact guard assist.  4.  Patient will ambulate with moderate assistance for 40 feet with the least restrictive device. PLOF: Pt poor historian and confused. Per chart review, pt is coming from [facility] rehab. Patient's daughter relay that she was progressing well in rehab; ambulating with RW with WC follow. Patients family members also provide her PLOF; was ambulating independently without AD. Outcome: Progressing Towards Goal       PHYSICAL THERAPY EVALUATION    Patient: Maddison Thorne (69 y.o. female)  Date: 12/20/2019  Primary Diagnosis: Altered mental status [R41.82]        Precautions: falls. ASSESSMENT :  Based on the objective data described below, the patient presents with decreased strength, impaired balance, and difficulty performing transfers and functional mobility. Evaluation performed with OT to increase pt safety and maximize mobility. Patient is only oriented to self and has difficulty following simple commands. She is not able to answer closer questions appropriately. Pt's family members report pt's confusion/dementia worsening over time. Pt requires max A x2 for bed mobility and supine<>sit transfers. Patient will benefit from skilled PT services to address impairments listed above in order to maximize independence and increase safety with transfers/functional mobility.      Patient will benefit from skilled intervention to address the above impairments. Patient's rehabilitation potential is considered to be Fair  Factors which may influence rehabilitation potential include:   []         None noted  [x]         Mental ability/status  [x]         Medical condition  []         Home/family situation and support systems  [x]         Safety awareness  []         Pain tolerance/management  []         Other:      PLAN :  Recommendations and Planned Interventions:   [x]           Bed Mobility Training             []    Neuromuscular Re-Education  [x]           Transfer Training                   []    Orthotic/Prosthetic Training  [x]           Gait Training                          []    Modalities  [x]           Therapeutic Exercises           []    Edema Management/Control  [x]           Therapeutic Activities            []    Family Training/Education  [x]           Patient Education  []           Other (comment):    Frequency/Duration: Patient will be followed by physical therapy 1-2 times per day/4-7 days per week to address goals.   Discharge Recommendations: Merlin Deleon  Further Equipment Recommendations for Discharge: rolling walker and wheelchair      SUBJECTIVE:   Patient stated -.    OBJECTIVE DATA SUMMARY:     Past Medical History:   Diagnosis Date    Dementia ([facility] [address]     Hypercholesterolemia     Hypertension      Past Surgical History:   Procedure Laterality Date    HX CATARACT REMOVAL Left     HX  SECTION       Barriers to Learning/Limitations: yes;  cognitive and altered mental status (i.e.Sedation, Confusion)  Compensate with: Visual Cues, Verbal Cues, and Tactile Cues  Home Situation:  Home Situation  Home Environment: Rehabilitation facility  One/Two Story Residence: Other (Comment)  Living Alone: No  Support Systems: Skilled nursing facility  Patient Expects to be Discharged to[de-identified] Rehabilitation facility  Current DME Used/Available at Home: paris Borja Behavior:  Neurologic State: Alert;Confused  Orientation Level: Oriented to person;Disoriented to time;Disoriented to situation;Disoriented to place  Cognition: Decreased command following     Psychosocial  Patient Behaviors: Altered mental status;Calm; Cooperative     Strength:    Strength: Generally decreased, functional    Tone & Sensation:   Tone: Normal    Range Of Motion:  AROM: Generally decreased, functional  PROM: Within functional limits    Functional Mobility:  Bed Mobility:  Rolling: Maximum assistance;Assist x2  Supine to Sit: Maximum assistance;Assist x2  Sit to Supine: Maximum assistance;Assist x2  Scooting: Maximum assistance;Assist x2  Transfers:  Sit to Stand: Total assistance;Assist x2     Balance:   Sitting: Intact        Activity Tolerance:   Fair  Please refer to the flowsheet for vital signs taken during this treatment. After treatment:   []         Patient left in no apparent distress sitting up in chair  [x]         Patient left in no apparent distress in bed  [x]         Call bell left within reach  [x]         Nursing notified  []         Caregiver present  []         Bed alarm activated  []         SCDs applied    COMMUNICATION/EDUCATION:   [x]         Role of Physical Therapy in the acute care setting. [x]         Fall prevention education was provided and the patient/caregiver indicated understanding. [x]         Patient/family have participated as able in goal setting and plan of care. []         Patient/family agree to work toward stated goals and plan of care. []         Patient understands intent and goals of therapy, but is neutral about his/her participation. []         Patient is unable to participate in goal setting/plan of care: ongoing with therapy staff.  []         Other:     Thank you for this referral.  Ruthann James, PT   Time Calculation: 20 mins      Eval Complexity: History: MEDIUM  Complexity : 1-2 comorbidities / personal factors will impact the outcome/ POC Exam:MEDIUM Complexity : 3 Standardized tests and measures addressing body structure, function, activity limitation and / or participation in recreation  Presentation: MEDIUM Complexity : Evolving with changing characteristics  Clinical Decision Making:Medium Complexity    Overall Complexity:MEDIUM

## 2019-12-20 NOTE — PROGRESS NOTES
MBS completed with recs of soft solid diet and pudding-thick liquids, meds as tolerated. Full report to follow.      Thank you for this referral.    Colleen Loco M.S. CCC-SLP/L  Speech-Language Pathologist

## 2019-12-20 NOTE — PROGRESS NOTES
Bedside and Verbal shift change report given to Tiara (oncoming nurse) by Rajinder Lo (offgoing nurse). Report included the following information SBAR, Kardex, Procedure Summary, MAR and Recent Results.

## 2019-12-20 NOTE — CONSULTS
Andre Galo is a 80 y.o., right handed female, with a history of advanced dementia, resident of a local nursing home, who presents to the hospital with worsening mentation changes. Apparently she had fallen out of bed while at the nursing home and the family has noted a general deterioration of her status. He has had decreased appetite and was not drinking very much. Also was noted that she is having some twitching movements of her arms when stimulated. I have been asked to see her for this. Social History; patient is a . She does not smoke does not drink. Family History; son with lung cancer.     Current Facility-Administered Medications   Medication Dose Route Frequency Provider Last Rate Last Dose    fluconazole (DIFLUCAN) 40 mg/mL oral suspension 160 mg  160 mg Oral Archie Lopez MD        cyanocobalamin (VITAMIN B12) sublingual tablet 2,500 mcg  2,500 mcg Oral DAILY Court Miracle A, DO   2,500 mcg at 19 5597    traZODone (DESYREL) tablet 25 mg  25 mg Oral TID PRN Court Miracle A, DO   25 mg at 19    lactated Ringers infusion  75 mL/hr IntraVENous CONTINUOUS Court Miracle A, DO 75 mL/hr at 19 0456 75 mL/hr at 19 0456    pantoprazole (PROTONIX) 40 mg in 0.9% sodium chloride 10 mL injection  40 mg IntraVENous Q12H Court Miracle A, DO   40 mg at 19 214    oxyCODONE-acetaminophen (PERCOCET) 5-325 mg per tablet 1 Tab  1 Tab Oral Q6H PRN Sean Gomez MD   1 Tab at 19    sodium chloride (NS) flush 5-10 mL  5-10 mL IntraVENous PRN Clara Natasha, DO           Past Medical History:   Diagnosis Date    Dementia ([address]     Hypercholesterolemia     Hypertension        Past Surgical History:   Procedure Laterality Date    HX CATARACT REMOVAL Left     HX  SECTION         Allergies   Allergen Reactions    Aspirin Itching    Pcn [Penicillins] Unable to Obtain       Patient Active Problem List   Diagnosis Code    HTN, goal below 150/90 I10    Bilateral hearing loss H91.93    Late onset Alzheimer's disease with behavioral disturbance (HCC) G30.1, F02.81    Hyperlipidemia E78.5    TB lung, latent Z22.7    Class 1 obesity due to excess calories with serious comorbidity and body mass index (BMI) of 34.0 to 34.9 in adult E66.09, Z68.34    Primary osteoarthritis involving multiple joints M15.0    Post herpetic neuralgia B02.29    Aggressive behavior R46.89    AMS (altered mental status) R41.82    Dementia (HCC) F03.90    Risk for falls Z91.81    Altered mental status R41.82    Lactic acidosis E87.2    Acute kidney injury ([facility] [address] N17.9         Review of Systems:   A could not be obtained from the patient because of her mental status. PHYSICAL EXAMINATION:      VITAL SIGNS:    Visit Vitals  /57 (BP 1 Location: Left arm, BP Patient Position: At rest)   Pulse (!) 57   Temp 98.5 °F (36.9 °C)   Resp 18   Ht 4' 11\" (1.499 m)   Wt 76.9 kg (169 lb 8 oz)   SpO2 97%   Breastfeeding No   BMI 34.23 kg/m²       GENERAL: The patient is well developed, well nourished, and in no apparent distress. EXTREMITIES: No clubbing, cyanosis, or edema is identified. Pulses 2+ and symmetrical.  Muscle tone is normal.  HEAD:   Ear, nose, and throat appear to be without trauma. The patient is normocephalic. NEUROLOGIC EXAMINATION    MENTAL STATUS: The patient is awake, alert, and oriented x 2. She knows her name and knows that she is in the hospital.  She has no idea what hospital it is and cannot tell me the date. Her speech is very slurred. Her speech is sparse. I cannot check a memory because of the lack of cooperation. CRANIAL NERVES: II  Visual fields are full to threat. Funduscopic examination reveals flat disks bilaterally. Pupils are both 2 mm and sluggishly reactive to light. III, IV, VI  Extraocular movements are intact and there is no nystagmus. V  Facial sensation is intact to pinprick.   VII  Face is symmetrical.   VIII - Hearing is present. IX, X, [address] rises symmetrically. Gag is depressed. Tongue is in the midline. XI - Shoulder shrugging and head turning intact  MOTOR:  The patient is 4/5 in all four limbs without any drift. Fine finger movements are slow but symmetrical.  Isolated motor group testing reveals no focal abnormalities. Tone is normal.  Sensory examination is intact to pinprick, light touch and position sense testing. Reflexes are 2+ and symmetrical. Plantars are down going. Cerebellar examination reveals no gross ataxia or dysmetria. Gait is not tested at this time, the patient is nonambulatory. To loud stimulation she does seem to have a myoclonic jerk. .       Final result (Exam End: 12/18/2019 19:25) Provider Status: Open   Study Result     CT brain without enhancement      CPT code: 67591     Indication: Confusion and delirium.     Technique: Unenhanced 5 mm collimation axial images obtained from the skull base  through the vertex.     Dose reduction techniques used: Automated exposure control, adjustment of the  mAs and/or kVp according to patient size, standardized low-dose protocol, and/or  iterative reconstruction technique.     Comparison: None.     Findings: There is no intracranial hemorrhage. There is no evidence for mass.      Moderate cortical atrophy is present with compensatory ventricular dilatation.       The gray-white matter differentiation is acutely preserved. There are moderate  white matter hypodensities. No extra-axial fluid collections. The ventricles  are symmetric and midline in position. Vascular structures are symmetric in  attenuation. Basilar cisterns are patent.      Sinuses: Clear. Orbits: Normal.  Calvarium:Normal.     IMPRESSION  Impression:     1. No acute intracranial pathology. 2. Moderate burden of presumed chronic small vessel ischemic disease and volume  loss         I have reviewed the above imagines myself.        CBC:   Lab Results   Component Value Date/Time    WBC 11.2 12/20/2019 03:20 AM    RBC 4.39 12/20/2019 03:20 AM    HGB 12.1 12/20/2019 03:20 AM    HCT 37.3 12/20/2019 03:20 AM    PLATELET 617 65/41/9385 03:20 AM     BMP:   Lab Results   Component Value Date/Time    Glucose 93 12/20/2019 03:20 AM    Sodium 147 (H) 12/20/2019 03:20 AM    Potassium 4.9 12/20/2019 03:20 AM    Chloride 118 (H) 12/20/2019 03:20 AM    CO2 24 12/20/2019 03:20 AM    BUN 28 (H) 12/20/2019 03:20 AM    Creatinine 1.06 12/20/2019 03:20 AM    Calcium 8.7 12/20/2019 03:20 AM     CMP:   Lab Results   Component Value Date/Time    Glucose 93 12/20/2019 03:20 AM    Sodium 147 (H) 12/20/2019 03:20 AM    Potassium 4.9 12/20/2019 03:20 AM    Chloride 118 (H) 12/20/2019 03:20 AM    CO2 24 12/20/2019 03:20 AM    BUN 28 (H) 12/20/2019 03:20 AM    Creatinine 1.06 12/20/2019 03:20 AM    Calcium 8.7 12/20/2019 03:20 AM    Anion gap 5 12/20/2019 03:20 AM    BUN/Creatinine ratio 26 (H) 12/20/2019 03:20 AM    Alk. phosphatase 117 12/18/2019 06:53 PM    Protein, total 6.9 12/18/2019 06:53 PM    Albumin 2.8 (L) 12/18/2019 06:53 PM    Globulin 4.1 (H) 12/18/2019 06:53 PM    A-G Ratio 0.7 (L) 12/18/2019 06:53 PM     Coagulation: No results found for: PTP, INR, APTT, PTTT, INREXT  Cardiac markers: No results found for: CPK, CKND1, MARIANO       Impression: Advanced dementia in this patient who has risk factors including advanced age. She appears to have Alzheimer's disease. 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 This is not uncommon in patients with neurodegenerative diseases especially dementia. Plan: Sadly from the neurologic standpoint not a lot we can do for a 80year-old with advanced dementia. We will follow with you. PLEASE NOTE:   This document has been produced using voice recognition software. Unrecognized errors in transcription may be present.

## 2019-12-20 NOTE — PROGRESS NOTES
Bedside shift change report given to [address] [place], [place] [address]lazaro Zavala (oncoming nurse) by Yessica Bowles (offgoing nurse). Report included the following information SBAR, Kardex and MAR.

## 2019-12-20 NOTE — PROGRESS NOTES
Admit Date: 12/18/2019  Date of Service: 12/20/2019    Reason for follow-up: Altered mental status      Assessment:         Altered mental status: Improved compared to yesterday . the patient has become more somnolent with decreased p.o. intake over the last 2 to 3 weeks. CT of the head without any infarcts. Abdominal pain: KUB with nonobstructive bowel gas pattern, abdominal ultrasound is unremarkable, pelvic x-ray without any fractures. Started on Protonix for possible GERD  hyperkalemia: Resolved  Mild hypernatremia: Suspect related to dehydration  Mild leukocytosis: No hypertension no tachycardia no hypoxia, no fever, blood cultures no growth to date, urine culture no growth to date; influenza negative; chest x-ray without infiltrates  Acute on chronic kidney disease stage II: Exacerbated by dehydration  Elevated lactate without lactic acidosis: Suspect due to hypoperfusion from dehydration  Plan:   Discontinue cefepime and vancomycin  Continue Protonix  Follow-up 12/18/2019 blood cultures x2 sets thus far negative  Follow-up 12/18/2019 urine culture thus far negative  Continue IV fluids at 75 cc/h  CBC BMP in a.m. Awaiting CT of the abdomen results.   -I have personally reviewed the images and note significant gas and stool in the colon  Neurology consult as per family request.  Modified barium swallow pending given patient's dysphagia    Current Antibtiocs:   Cefepime and vancomycin-   Lines:   Peripheral    Case discussed with:  [x]Patient  [x]Family  [x]Nursing  []Case Management  DVT Prophylaxis:  []Lovenox  []Hep SQ  []SCDs  []Coumadin   []On Heparin gtt    I have independently examined the patient and reviewed all lab studies and imgaing as well as review of nursing notes and physican notes from the past 24 hours. Ehsan Pacheco D.O. Pager [phone]      Allergies   Allergen Reactions    Aspirin Itching    Pcn [Penicillins] Unable to Obtain           Subjective:      Pt seen and examined. Daughter daughter-in-law and son-in-law are all at bedside today. Nursing is also at bedside. Patient is much more awake and alert today she is making eye contact and she is much more interactive. Family reports that she slept well last night. They feel that she is continuing to have \"spasms\" possibly related to pain or her pure wick catheter. PT and OT in to see patient but the patient was not able to participate because she could not follow directions. Objective:        Visit Vitals  /57 (BP 1 Location: Left arm, BP Patient Position: At rest)   Pulse (!) 57   Temp 98.5 °F (36.9 °C)   Resp 18   Ht 4' 11\" (1.499 m)   Wt 76.9 kg (169 lb 8 oz)   SpO2 97%   Breastfeeding No   BMI 34.23 kg/m²     Temp (24hrs), Av.9 °F (37.2 °C), Min:98 °F (36.7 °C), Max:100.3 °F (37.9 °C)        General:   oriented to person. Appears her stated age   Skin:   no rashes or skin lesions noted on limited exam, dry and warm   HEENT:  No scleral icterus or pallor; oral mucosa moist, lips moist   Lymph Nodes:   not assessed today   Lungs:   non, labored; bilaterally clear to aspiration- no crackles wheezes rales or rhonchi   Heart:  RRR, s1 and s2; no murmurs rubs or gallops; no edema, + pedal pulses   Abdomen:  soft, non-distended, active bowel sounds, non-tender   Genitourinary:  deferred   Extremities:   average muscle tone; no contractures, no joint effusions   Neurologic:  No gross focal motor or sensory abnormalities; CN 2-12 intact; Follows some commands. Moves extremities independently. Repeats 2-3 words of each question. Recognizes family members. Psychiatric:   Pleasantly confused.   Smiles       Labs: Results:   Chemistry Recent Labs     19  0320 19  0231 19  1853   GLU 93 107* 103*   * 148* 144   K 4.9 4.8 5.7*   * 119* 113*   CO2 24 23 23   BUN 28* 38* 46*   CREA 1.06 1.19 1.35*   CA 8.7 8.4* 9.1   AGAP 5 6 8   BUCR 26* 32* 34*   AP  --   --  117   TP  --   --  6.9   ALB  -- --  2.8*   GLOB  --   --  4.1*   AGRAT  --   --  0.7*      CBC w/Diff Recent Labs     12/20/19  0320 12/18/19  1853   WBC 11.2 14.8*   RBC 4.39 4.89   HGB 12.1 13.8   HCT 37.3 40.6    269   GRANS  --  77*   LYMPH  --  14*   EOS  --  1        No results found for: SDES Lab Results   Component Value Date/Time    Culture result: (A) 12/18/2019 08:04 PM     84519 COLONIES/mL STAPHYLOCOCCUS SPECIES, COAGULASE NEGATIVE    Culture result: [id number] COLONIES/mL YEAST (A) 12/18/2019 08:04 PM    Culture result: NO GROWTH 2 DAYS 12/18/2019 07:00 PM    Culture result: NO GROWTH 2 DAYS 12/18/2019 06:40 PM        Results     Procedure Component Value Units Date/Time    INFLUENZA A & B AG (RAPID TEST) [[id number]] Collected:  12/18/19 2223    Order Status:  Completed Specimen:  Nasopharyngeal from Nasal washing Updated:  12/18/19 2243     Influenza A Antigen NEGATIVE         Comment: A negative result does not exclude influenza virus infection, seasonal or H1N1 due to suboptimal sensitivity. If influenza is circulating in your community, a diagnosis of influenza should be considered based on a patients clinical presentation and empiric antiviral treatment should be considered, if indicated.         Influenza B Antigen NEGATIVE        CULTURE, URINE [[id number]]  (Abnormal) Collected:  12/18/19 2004    Order Status:  Completed Specimen:  Cath Urine Updated:  12/20/19 0938     Special Requests: NO SPECIAL REQUESTS        Culture result:       00476 COLONIES/mL STAPHYLOCOCCUS SPECIES, COAGULASE NEGATIVE            21848 COLONIES/mL YEAST       CULTURE, BLOOD [[id number]] Collected:  12/18/19 1900    Order Status:  Completed Specimen:  Blood Updated:  12/20/19 0724     Special Requests: NO SPECIAL REQUESTS        Culture result: NO GROWTH 2 DAYS       CULTURE, BLOOD [[id number]] Collected:  12/18/19 1840    Order Status:  Completed Specimen:  Blood Updated:  12/20/19 0724     Special Requests: NO SPECIAL REQUESTS        Culture result: NO GROWTH 2 DAYS       CULTURE, URINE [[id number]]     Order Status:  No result Specimen:  Cath Urine           Imaging:     Xr Abd (kub)    Result Date: 12/19/2019  IMPRESSION: Nonspecific and nonobstructive bowel gas pattern. Ct Head Wo Cont    Result Date: 12/18/2019  Impression: 1. No acute intracranial pathology. 2. Moderate burden of presumed chronic small vessel ischemic disease and volume loss    Us Retroperitoneum Comp    Result Date: 12/19/2019  IMPRESSION:  Small bilateral kidneys. Otherwise, unremarkable renal ultrasound examination. Xr Chest Port    Result Date: 12/18/2019  IMPRESSION: Cardiomegaly and venous congestion without overt failure    Xr Hips Bi W Ap Pelv    Result Date: 12/18/2019  Impression: 1. No radiographically apparent acute fracture or dislocation. If clinical concern remains high, cross-sectional imaging should be obtained.

## 2019-12-20 NOTE — PROGRESS NOTES
Problem: Self Care Deficits Care Plan (Adult)  Goal: *Acute Goals and Plan of Care (Insert Text)  Description  Occupational Therapy Goals  Initiated 12/20/2019 within 7 day(s). 1.  Patient will perform self-feeding with minimal assistance/contact guard assist.   2.  Patient will perform grooming with minimal assistance/contact guard assist.  3.  Patient will perform upper body dressing with moderate assistance . 4. Patient will perform toilet transfers with moderate assistance. 5.  Patient will participate in upper extremity therapeutic exercise/activities with minimal assistance for 8 minutes. Prior Level of Function: Pt only oriented to person. Per medical chart, pt is coming from [facility] where she was receiving rehab services. Pt's family members (daughter and son) were able to provide PLOF. Per daughter, pt was able to perform self-feeding, grooming, and ambulating to the toileting (I) prior to her first hospitalization stay. Pt was able to dress UB with min assist for correction (ie. pt would wear shirts inside/out, wearing layers when it was hot); pt's daughter would assist with LB dressing and bathing BLEs and back. Pt ambulated using a RW and was progressing well, ambulating down the hallway at the SNF with wheelchair follow-up. Pt lives with her daughter in a [address] Outcome: Progressing Towards Goal   OCCUPATIONAL THERAPY EVALUATION    Patient: Isabel Solorzano (51 y.o. female)  Date: 12/20/2019  Primary Diagnosis: Altered mental status [R41.82]        Precautions: Falls; Aspiration       ASSESSMENT :  Upon entering room, pt received semi-reclined in bed, alert, and agreeable to OT eval. Pt seen in conjunction with PT to maximize safety of patient and staff members.  Based on the objective data described below, the patient presents with increased pain, decreased strength, decreased activity tolerance, decreased standing balance, decreased ability to safely perform functional transfers and mobility, confusion, and decreased ability to follow commands, affecting the patients safety and ability to perform basic ADLs. Pt only oriented to self; pt's family members able to provide PLOF. Pt performed all bed mobility/ADLs with max-total assist x2. Pt presents she is able to hold onto spoon with food, but requires max assist to bring spoon to mouth. During this session, pt had 2 spasms lasting 1-2 minutes. Educated pt on the role of Ot; pt demo no evidence of learning. The patient requires skilled OT services to assess safety and increase independence with basic self-care/ADLs, enhancing the patients quality of life by improving their ability to return to PLOF. At the end of the session, pt left resting comfortably in bed, call bell in reach, with all needs met. Patient will benefit from skilled intervention to address the above impairments.   Patient's rehabilitation potential is considered to be Good  Factors which may influence rehabilitation potential include:   []             None noted  [x]             Mental ability/status  [x]             Medical condition  [x]             Home/family situation and support systems  [x]             Safety awareness  [x]             Pain tolerance/management  []             Other:      PLAN :  Recommendations and Planned Interventions:   [x]               Self Care Training                  [x]      Therapeutic Activities  [x]               Functional Mobility Training   [x]      Cognitive Retraining  [x]               Therapeutic Exercises           [x]      Endurance Activities  [x]               Balance Training                    [x]      Neuromuscular Re-Education  [x]               Visual/Perceptual Training     [x]      Home Safety Training  [x]               Patient Education                   [x]      Family Training/Education  []               Other (comment):    Frequency/Duration: Patient will be followed by occupational therapy 1-2 times per day/4-7 days per week to address goals. Discharge Recommendations: [facility]  Further Equipment Recommendations for Discharge: TBD at the next level of scale. SUBJECTIVE:   Patient stated [employer]!     OBJECTIVE DATA SUMMARY:     Past Medical History:   Diagnosis Date    Dementia ([address]     Hypercholesterolemia     Hypertension      Past Surgical History:   Procedure Laterality Date    HX CATARACT REMOVAL Left     HX  SECTION       Barriers to Learning/Limitations: yes;  cognitive and altered mental status (i.e.Sedation, Confusion)  Compensate with: visual, verbal, tactile, kinesthetic cues/model    Home Situation:   Home Situation  Home Environment: Rehabilitation facility  One/Two Story Residence: Other (Comment)  Living Alone: No  Support Systems: Skilled nursing facility  Patient Expects to be Discharged to[de-identified] Rehabilitation facility  Current DME Used/Available at Home: Cane, straight  [x]  Right hand dominant   []  Left hand dominant    Cognitive/Behavioral Status:  Neurologic State: Alert  Orientation Level: Oriented to person;Disoriented to place; Disoriented to time;Disoriented to situation  Cognition: No command following;Memory loss       Skin: Visible skin appeared intact. Edema: None noted    Coordination: BUE  Fine Motor Skills-Upper: Left Intact; Right Intact    Gross Motor Skills-Upper: Left Intact; Right Intact    Balance:  Sitting: Intact    Strength: BUE  Strength: Generally decreased, functional    Tone & Sensation: BUE  Tone: Normal        Range of Motion: BUE  AROM: Generally decreased, functional  PROM: Within functional limits        Functional Mobility and Transfers for ADLs:  Bed Mobility:  Rolling: Maximum assistance;Assist x2  Supine to Sit: Maximum assistance;Assist x2  Sit to Supine: Maximum assistance;Assist x2  Scooting: Maximum assistance;Assist x2  Transfers:  Sit to Stand:  Total assistance;Assist x2(Unable to fully stand)    ADL Assessment:   Feeding: Maximum assistance    Oral Facial Hygiene/Grooming: Maximum assistance    Bathing: Total assistance    Upper Body Dressing: Total assistance    Lower Body Dressing: Total assistance    Toileting: Total assistance    ADL Intervention:  Feeding  Feeding Assistance: Maximum assistance  Utensil Management: Maximum assistance    Lower Body Dressing Assistance  Dressing Assistance: Total assistance(dependent)  Socks: Total assistance (dependent)      Pain:  Pt appeared in distress when pt had spasms x 2 during this session. Pain Intervention(s): Medication (see MAR); Rest, Ice, Repositioning   Response to intervention: Nurse notified, See doc flow    Activity Tolerance:   Fair    Please refer to the flowsheet for vital signs taken during this treatment. After treatment:   [] Patient left in no apparent distress sitting up in chair  [x] Patient left in no apparent distress in bed  [x] Call bell left within reach  [x] Nursing notified  [] Caregiver present  [] Bed alarm activated    COMMUNICATION/EDUCATION:   [x] Role of Occupational Therapy in the acute care setting  [] Home safety education was provided and the patient/caregiver indicated understanding. [x] Patient/family have participated as able in goal setting and plan of care. [x] Patient/family agree to work toward stated goals and plan of care. [] Patient understands intent and goals of therapy, but is neutral about his/her participation. [] Patient is unable to participate in goal setting and plan of care. Thank you for this referral.  Luis Lira MS, OTR/L  Time Calculation: 29 mins    Eval Complexity: History: MEDIUM Complexity : Expanded review of history including physical, cognitive and psychosocial  history ; Examination: MEDIUM Complexity : 3-5 performance deficits relating to physical, cognitive , or psychosocial skils that result in activity limitations and / or participation restrictions;    Decision Making:MEDIUM Complexity : Patient may present with comorbidities that affect occupational performnce.  Miniml to moderate modification of tasks or assistance (eg, physical or verbal ) with assesment(s) is necessary to enable patient to complete evaluation

## 2019-12-20 NOTE — PROGRESS NOTES
Problem: Dysphagia (Adult)  Goal: *Acute Goals and Plan of Care (Insert Text)  Description  Patient will:  1. Tolerate PO trials with 0 s/s overt distress in 4/5 trials  2. Utilize compensatory swallow strategies/maneuvers (decrease bite/sip, size/rate, alt. liq/sol) with min cues in 4/5 trials  3. Perform oral-motor/laryngeal exercises to increase oropharyngeal swallow function with min cues  4. Complete an objective swallow study (i.e., MBSS) to assess swallow integrity, r/o aspiration, and determine of safest LRD, min A - met 12/20/19    Rec:     Soft solid with pudding-thick liquids  Aspiration precautions  HOB >45 during po intake, remain >30 for 30-45 minutes after po   Small bites/sips; alternate liquid/solid with slow feeding rate   Oral care TID  Meds crushed in puree  Assistance with PO as needed    SLP services upon DC from this facility    Outcome: [id number] STUDY & TREATMENT    Patient: Pascale Lima (58 y.o. female)  Date: 12/20/2019  Primary Diagnosis: Altered mental status [R41.82]        Precautions: aspiration       ASSESSMENT :  Based on the objective data described below, the patient presents with mild oral and mod-sev pharyngeal dysphagia c/b silent penetration to laryngeal vestibule with nectar-thick and honey-thick liquids. Improved tolerance of reg solids and puree consistencies with no aspiration/penetration events. Increased bolus manipulation observed with premature spillage across all trials. Positive oropharyngeal clearance appreciated. Deficits include decreased base of tongue retraction, impaired laryngeal elevation/excursion, and poor pharyngeal motility/sensation. Pt safe for soft solid diet with pudding-thick liquids, aspiration precautions, oral care TID, and meds crushed in puree. She will require assistance with PO. Rec SLP services upon DC from this facility.      TREATMENT :  Treatment provided post diagnostic testing including oropharyngeal anatomy/physiology, MBS results, diet recommendations, importance of thickened liquids, and compensatory strategies/positioning. Pt able to verbalize understanding - suspect limited carryover. Will continue to follow. Patient will benefit from skilled intervention to address the above impairments. Patient's rehabilitation potential is considered to be Fair  Factors which may influence rehabilitation potential include:   [x]              Mental ability/status  [x]              Medical condition  [x]              Safety awareness     PLAN :  Recommendations and Planned Interventions: See above  Frequency/Duration: Patient will be followed by speech-language pathology 1-2 times per day/4-7 days per week to address goals. Discharge Recommendations: [facility] and To Be Determined     SUBJECTIVE:   Patient stated That's nasty. - referring to barium    OBJECTIVE:     Past Medical History:   Diagnosis Date    Dementia ([facility] [address]     Hypercholesterolemia     Hypertension      Past Surgical History:   Procedure Laterality Date    HX CATARACT REMOVAL Left     HX  SECTION       Prior Level of Function/Home Situation: see below  Home Situation  Home Environment: Rehabilitation facility  One/Two Story Residence: Other (Comment)  Living Alone: No  Support Systems: Skilled nursing facility  Patient Expects to be Discharged to[de-identified] Rehabilitation facility  Current DME Used/Available at Home: Cane, straight  Diet prior to admission: soft solid with nectar-thick per previous SLP recs  Current Diet:  soft solid with pudding-thick   Radiologist:    Film Views: Lateral;Fluoro  Patient Position: 90 in fluoro chair    Trial 1: Trial 2:   Consistency Presented: Nectar thick liquid;Honey thick liquid Consistency Presented: Puree; Solid   How Presented: Self-fed/presented;Cup/sip How Presented: Self-fed/presented;Spoon         Bolus Acceptance: No impairment Bolus Acceptance: No impairment   Bolus Formation/Control: Impaired: Premature spillage Bolus Formation/Control: Impaired: Delayed;Mastication;Premature spillage   Propulsion: Delayed (# of seconds) Propulsion: Delayed (# of seconds)   Oral Residue: None Oral Residue: None   Initiation of Swallow: Triggered at vallecula Initiation of Swallow: Triggered at valleculae   Timing: No impairment Timing: No impairment   Penetration: During swallow; To laryngeal vestibule Penetration: None   Aspiration/Timing: No evidence of aspiration Aspiration/Timing: No evidence of aspiration   Pharyngeal Clearance: No residue Pharyngeal Clearance: No residue   Attempted Modifications: Small sips and bites Attempted Modifications: Small sips and bites; Alternate liquids/solids   Effective Modifications: None Effective Modifications: Alternate liquids/solids(small bites/sips)   Cues for Modifications: Moderate-maximal Cues for Modifications: Moderate         Decreased Tongue Base Retraction?: Yes  Laryngeal Elevation: Reduced excursion with laryngeal vestibule gap;Minimal movement of larynx/epiglottis  Aspiration/Penetration Score: 3 (Penetration/Visible residue-Contrast remains above the folds/cords, but is not cleared)  Pharyngeal Symmetry: Not assessed  Pharyngeal-Esophageal Segment: No impairment  Pharyngeal Dysfunction: Decreased strength;Decreased elevation/closure;Decreased pharyngeal wall constriction;Decreased tongue base retraction    Oral Phase Severity: Mild  Pharyngeal Phase Severity: Moderately severe    8-point Penetration-Aspiration Scale: Score 3    PAIN:  Pt reports 0/10 pain or discomfort prior to MBS. Pt reports 0/10 pain or discomfort post MBS. COMMUNICATION/EDUCATION:   [x]  Patient educated regarding MBS results and diet recommendations. [x]  Patient/family have participated as able in goal setting and plan of care.     Thank you for this referral.    Stephanie Hendricks M.S. CCC-SLP/L  Speech-Language Pathologist

## 2019-12-21 VITALS
SYSTOLIC BLOOD PRESSURE: 101 MMHG | RESPIRATION RATE: 16 BRPM | HEART RATE: 61 BPM | WEIGHT: 171.7 LBS | TEMPERATURE: 98.4 F | OXYGEN SATURATION: 95 % | DIASTOLIC BLOOD PRESSURE: 60 MMHG | HEIGHT: 59 IN | BODY MASS INDEX: 34.61 KG/M2

## 2019-12-21 LAB
ANION GAP SERPL CALC-SCNC: 6 MMOL/L (ref 3–18)
BUN SERPL-MCNC: 21 MG/DL (ref 7–18)
BUN/CREAT SERPL: 21 (ref 12–20)
CALCIUM SERPL-MCNC: 8.4 MG/DL (ref 8.5–10.1)
CHLORIDE SERPL-SCNC: 115 MMOL/L (ref 100–111)
CO2 SERPL-SCNC: 25 MMOL/L (ref 21–32)
CREAT SERPL-MCNC: 1.02 MG/DL (ref 0.6–1.3)
ERYTHROCYTE [DISTWIDTH] IN BLOOD BY AUTOMATED COUNT: 14.6 % (ref 11.6–14.5)
GLUCOSE SERPL-MCNC: 92 MG/DL (ref 74–99)
HCT VFR BLD AUTO: 36.3 % (ref 35–45)
HGB BLD-MCNC: 11.9 G/DL (ref 12–16)
MCH RBC QN AUTO: 27.7 PG (ref 24–34)
MCHC RBC AUTO-ENTMCNC: 32.8 G/DL (ref 31–37)
MCV RBC AUTO: 84.6 FL (ref 74–97)
PLATELET # BLD AUTO: 230 K/UL (ref 135–420)
PMV BLD AUTO: 12.3 FL (ref 9.2–11.8)
POTASSIUM SERPL-SCNC: 4.3 MMOL/L (ref 3.5–5.5)
RBC # BLD AUTO: 4.29 M/UL (ref 4.2–5.3)
SODIUM SERPL-SCNC: 146 MMOL/L (ref 136–145)
WBC # BLD AUTO: 10.3 K/UL (ref 4.6–13.2)

## 2019-12-21 PROCEDURE — 80048 BASIC METABOLIC PNL TOTAL CA: CPT

## 2019-12-21 PROCEDURE — 74011250637 HC RX REV CODE- 250/637: Performed by: INTERNAL MEDICINE

## 2019-12-21 PROCEDURE — C9113 INJ PANTOPRAZOLE SODIUM, VIA: HCPCS | Performed by: HOSPITALIST

## 2019-12-21 PROCEDURE — 74011250636 HC RX REV CODE- 250/636: Performed by: HOSPITALIST

## 2019-12-21 PROCEDURE — 36415 COLL VENOUS BLD VENIPUNCTURE: CPT

## 2019-12-21 PROCEDURE — 74011000250 HC RX REV CODE- 250: Performed by: HOSPITALIST

## 2019-12-21 PROCEDURE — 85027 COMPLETE CBC AUTOMATED: CPT

## 2019-12-21 PROCEDURE — 74011250637 HC RX REV CODE- 250/637: Performed by: HOSPITALIST

## 2019-12-21 RX ADMIN — Medication 2500 MCG: at 08:38

## 2019-12-21 RX ADMIN — SODIUM CHLORIDE 40 MG: 9 INJECTION INTRAMUSCULAR; INTRAVENOUS; SUBCUTANEOUS at 08:37

## 2019-12-21 RX ADMIN — OXYCODONE AND ACETAMINOPHEN 1 TABLET: 5; 325 TABLET ORAL at 09:52

## 2019-12-21 NOTE — ROUTINE PROCESS
[facility] care of patient from [facility]. Pt AAOx4. No complaints voiced by patient. 1135- Discharge instructions explained to family at the bedside. All questions answered. 1200- Encouraged patient to eat and drink to prevent dehydration. 1235- Discharge report given to [facility] care nurse. All questions answered. 1258- Medical transport here to take patient to Nursing facility all paperwork given. Telemetry monitored removed. All IV's D/C'd.off unit with daughter via stretcher at 1301.

## 2019-12-21 NOTE — DISCHARGE SUMMARY
Discharge Summary    Patient: Enrike Chambers MRN: [id number]  CSN: [id number]    YOB: 1923  Age: 80 y.o. Sex: female    DOA: 12/18/2019 LOS:  LOS: 3 days   Discharge Date:      Admission Diagnoses: Altered mental status [R41.82]    Discharge Diagnoses:    Problem List as of 12/21/2019 Date Reviewed: 10/3/2019          Codes Class Noted - Resolved    * (Principal) Altered mental status ICD-10-CM: R41.82  ICD-9-CM: 780.97  12/18/2019 - Present        Lactic acidosis ICD-10-CM: E87.2  ICD-9-CM: 276.2  12/18/2019 - Present        Acute kidney injury ([facility] ICD-10-CM: N17.9  ICD-9-CM: 584.9  12/18/2019 - Present        Dementia ([facility] ICD-10-CM: F03.90  ICD-9-CM: 294.20  12/7/2019 - Present        Risk for falls ICD-10-CM: Z91.81  ICD-9-CM: V15.88  12/7/2019 - Present        Aggressive behavior ICD-10-CM: R46.89  ICD-9-CM: V40.39  12/3/2019 - Present        AMS (altered mental status) ICD-10-CM: R41.82  ICD-9-CM: 780.97  12/3/2019 - Present        Class 1 obesity due to excess calories with serious comorbidity and body mass index (BMI) of 34.0 to 34.9 in adult ICD-10-CM: E66.09, Z68.34  ICD-9-CM: 278.00, V85.34  4/25/2018 - Present        Primary osteoarthritis involving multiple joints ICD-10-CM: M15.0  ICD-9-CM: 715.09  4/25/2018 - Present        Post herpetic neuralgia ICD-10-CM: B02.29  ICD-9-CM: 053.19  4/25/2018 - Present        Hyperlipidemia ICD-10-CM: E78.5  ICD-9-CM: 272.4  2/16/2018 - Present        TB lung, latent ICD-10-CM: Z22.7  ICD-9-CM: 795.51  2/16/2018 - Present    Overview Signed 2/16/2018  2:24 PM by Yash Leong     Uncertain whether she had INH.              HTN, goal below 150/90 (Chronic) ICD-10-CM: I10  ICD-9-CM: 401.9  9/26/2017 - Present        Bilateral hearing loss ICD-10-CM: H91.93  ICD-9-CM: 389.9  9/26/2017 - Present        Late onset Alzheimer's disease with behavioral disturbance ([facility] ICD-10-CM: G30.1, F02.81  ICD-9-CM: 331.0, 294.11  9/26/2017 - Present Discharge Condition: Stable    PHYSICAL EXAM  Visit Vitals  /59 (BP 1 Location: Left arm, BP Patient Position: At rest)   Pulse (!) 57   Temp 98.3 °F (36.8 °C)   Resp 16   Ht 4' 11\" (1.499 m)   Wt 77.9 kg (171 lb 11.2 oz)   SpO2 94%   Breastfeeding No   BMI 34.68 kg/m²     General:   oriented to person. Appears her stated age   Skin:   no rashes or skin lesions noted on limited exam, dry and warm   HEENT:  No scleral icterus or pallor; oral mucosa moist, lips moist   Lymph Nodes:   not assessed today   Lungs:   non, labored; bilaterally clear to aspiration- no crackles wheezes rales or rhonchi   Heart:  RRR, s1 and s2; no murmurs rubs or gallops; no edema, + pedal pulses   Abdomen:  soft, non-distended, active bowel sounds, non-tender   Genitourinary:  deferred   Extremities:   average muscle tone; no contractures, no joint effusions   Neurologic:  No gross focal motor or sensory abnormalities; CN 2-12 intact; Follows some commands. Moves extremities independently. Repeats 2-3 words of each question. Recognizes family members. Psychiatric:   Pleasantly confused. [facility]      Hospital Course: Altered mental status: Improved compared to yesterday . the patient has become more somnolent with decreased p.o. intake over the last 2 to 3 weeks. CT of the head without any infarcts. - seen by Neurology for startle myoclonic jerks. No additional intervention is recommended at this time. Abdominal pain: KUB with nonobstructive bowel gas pattern, abdominal ultrasound is unremarkable, pelvic x-ray without any fractures. Started on Protonix for possible GERD  hyperkalemia: Resolved  Mild hypernatremia: Suspect related to dehydration. improving  Mild leukocytosis: Resolved.  No hypertension no tachycardia no hypoxia, no fever, blood cultures no growth to date, urine culture no growth to date; influenza negative; chest x-ray without infiltrates  Acute on chronic kidney disease stage II: Exacerbated by dehydration. Resolved. Elevated lactate without lactic acidosis: resolved. Suspect due to hypoperfusion from dehydration    Consults:   Neurology  Dr. Ahsan Lemos Diagnostic Studies: Xr Abd (kub)    Result Date: 12/19/2019  IMPRESSION: Nonspecific and nonobstructive bowel gas pattern. Xr Swallow Func Video    Result Date: 12/20/2019  IMPRESSION: 1. Laryngeal penetration of nectar and honey consistencies. 2. No luly penetration or aspiration with other tested consistencies. Please see speech pathologist report for additional details and recommendations. Ct Head Wo Cont    Result Date: 12/18/2019  Impression: 1. No acute intracranial pathology. 2. Moderate burden of presumed chronic small vessel ischemic disease and volume loss    Ct Abd Wo Cont    Result Date: 12/20/2019  IMPRESSION[de-identified] 1. Curvilinear calcification posterior right lobe liver of unclear etiology and clinical significance. Could reflect wall calcifications of the nodule, otherwise nonspecific. Not as dense as would be expected for granuloma. 2. No hydronephrosis or renal stones. No gallbladder distention. -Normal caliber aorta. Nonenlarged spleen. Motion degraded pancreas. No free fluid. -None of the included bowel is dilated. -No free fluid noted. 3. Suspect partial visualization of the dome of the distended urinary bladder. Us Retroperitoneum Comp    Result Date: 12/19/2019  IMPRESSION:  Small bilateral kidneys. Otherwise, unremarkable renal ultrasound examination. Xr Chest Port    Result Date: 12/18/2019  IMPRESSION: Cardiomegaly and venous congestion without overt failure    Xr Hips Bi W Ap Pelv    Result Date: 12/18/2019  Impression: 1. No radiographically apparent acute fracture or dislocation. If clinical concern remains high, cross-sectional imaging should be obtained.      Results     Procedure Component Value Units Date/Time    INFLUENZA A & B AG (RAPID TEST) [[id number]] Collected:  12/18/19 2223    Order Status: Completed Specimen:  Nasopharyngeal from Nasal washing Updated:  12/18/19 2243     Influenza A Antigen NEGATIVE         Comment: A negative result does not exclude influenza virus infection, seasonal or H1N1 due to suboptimal sensitivity. If influenza is circulating in your community, a diagnosis of influenza should be considered based on a patients clinical presentation and empiric antiviral treatment should be considered, if indicated. Influenza B Antigen NEGATIVE        CULTURE, URINE [[id number]]  (Abnormal) Collected:  12/18/19 2004    Order Status:  Completed Specimen:  Cath Urine Updated:  12/20/19 0938     Special Requests: NO SPECIAL REQUESTS        Culture result:       [id number] COLONIES/mL STAPHYLOCOCCUS SPECIES, COAGULASE NEGATIVE            78566 COLONIES/mL YEAST       CULTURE, BLOOD [[id number]] Collected:  12/18/19 1900    Order Status:  Completed Specimen:  Blood Updated:  12/21/19 0703     Special Requests: NO SPECIAL REQUESTS        Culture result: NO GROWTH 3 DAYS       CULTURE, BLOOD [[id number]] Collected:  12/18/19 1840    Order Status:  Completed Specimen:  Blood Updated:  12/21/19 0703     Special Requests: NO SPECIAL REQUESTS        Culture result: NO GROWTH 3 DAYS       CULTURE, URINE [[id number]]     Order Status:  No result Specimen:  Cath Urine             CBC W/O DIFF    Collection Time: 12/21/19  2:14 AM   Result Value Ref Range    WBC 10.3 4.6 - 13.2 K/uL    RBC 4.29 4. 20 - 5.30 M/uL    HGB 11.9 (L) 12.0 - 16.0 g/dL    HCT 36.3 35.0 - 45.0 %    MCV 84.6 74.0 - 97.0 FL    MCH 27.7 24.0 - 34.0 PG    MCHC 32.8 31.0 - 37.0 g/dL    RDW 14.6 (H) 11.6 - 14.5 %    PLATELET 886 186 - 432 K/uL    MPV 12.3 (H) 9.2 - 11.8 FL     BMP:   Lab Results   Component Value Date/Time     (H) 12/21/2019 02:14 AM    K 4.3 12/21/2019 02:14 AM     (H) 12/21/2019 02:14 AM    CO2 25 12/21/2019 02:14 AM    AGAP 6 12/21/2019 02:14 AM    GLU 92 12/21/2019 02:14 AM    BUN 21 (H) 12/21/2019 02:14 AM CREA 1.02 12/21/2019 02:14 AM    GFRAA >60 12/21/2019 02:14 AM    GFRNA 50 (L) 12/21/2019 02:14 AM      Discharge Medications:     Current Discharge Medication List      START taking these medications    Details   traZODone (DESYREL) 10 mg/mL susp 10 mg/mL oral suspension (compounded) Take 5 mL by mouth every evening. Qty: 1 Bottle, Refills: 0      potassium chloride (KLOR-CON) 25 mEq pack Take 1 Packet by mouth two (2) times daily (with meals). Qty: 60 Packet, Refills: 0         CONTINUE these medications which have NOT CHANGED    Details   cyanocobalamin (VITAMIN B12) 2,500 mcg sublingual tablet Take 1 Tab by mouth daily. Qty: 100 Tab, Refills: 0      magnesium oxide (MAG-OX) 400 mg tablet Take 1 Tab by mouth daily. Qty: 30 Tab, Refills: 0      losartan-hydroCHLOROthiazide (HYZAAR) 100-25 mg per tablet Take 1 Tab by mouth daily. Indications: high blood pressure  Qty: 90 Tab, Refills: 1    Associated Diagnoses: HTN, goal below 150/90      ergocalciferol (ERGOCALCIFEROL) 50,000 unit capsule Take 1 Cap by mouth every seven (7) days. Qty: 4 Cap, Refills: 3      nystatin (MYCOSTATIN) topical cream Apply  to affected area two (2) times a day. Qty: 30 g, Refills: 0    Associated Diagnoses: Candidal intertrigo      nepafenac (ILEVRO) 0.3 % drps Apply 1 Drop to eye daily. Indications: POSTOPERATIVE OCULAR PAIN, 1 drop left eye daily         STOP taking these medications       escitalopram oxalate (LEXAPRO) 10 mg tablet Comments:   Reason for Stopping:         potassium chloride (K-DUR, KLOR-CON) 20 mEq tablet Comments:   Reason for Stopping:         traZODone (DESYREL) 50 mg tablet Comments:   Reason for Stopping:               Activity: activity as tolerated, encourage PT. OT participation    Diet: soft solid with pudding thick liquids    Follow-up: with PCP, Danyelle Nava PA-C in 7-10days    Minutes spent on discharge: >30 minutes spent coordinating this discharge (review instructions/follow-up, prescriptions, preparing report for sign off)    Dispo: SNF

## 2019-12-21 NOTE — PROGRESS NOTES
Transition of Care Plan to SNF/Rehab    SNF/Rehab Transition:  Patient has been accepted to [address] [place] [address] and meets criteria for admission. Patient will  be transported by [facility] transport and expected to leave at 12:30PM.    Communication to Patient/Family:  Met with patient's daughter Mandie Soares) and she is agreeable to the transition plan. Communication to SNF/Rehab:  Bedside RN, Sandra, has been notified of the transition plan to the facility and to call report (phone number [phone] and ask for Unit [address]). Discharge information has been updated on the AVS. And communicated to facility via [facility], or CC link. SNF/Rehab Transition:    PCP/Specialist: Terrie Hanks Please include all hard scripts for controlled substances, med rec and dc summary, and AVS in packet. Reviewed and confirmed with facility representative, Manda Nelson at [address] [place] [address], that they can manage the patient care needs for the following:     Yanna Villasenor with (X) only those applicable:    Medication:  []  Medications will be available at the facility  []  IV Antibiotics   []  Controlled Substance - hard copy to be sent with patient   []  Weekly Labs    Documents:  [] Hard RX    [] MAR  [] Kardex  [] AVS  [] Wound care note  [x]Transfer Summary/Discharge Summary    Equipment:  []  CPAP/BiPAP  []  Wound Vacuum  []  Hunter or Urinary Device  []  PICC/Central Line  []  Nebulizer  []  Ventilator    Treatment:  []Isolation (for MRSA, VRE, etc.)  []Surgical Drain Management  []Tracheostomy Care  []Dressing Changes  []Dialysis with transportation and chair time  []PEG Care  []Oxygen  []Daily Weights for Heart Failure    Dietary:  []Any diet limitations  []Tube Feedings   []Total Parenteral Management (TPN)    Eligible for Medicaid Long Term Services and Supports  Yes:  [] Eligible for medical assistance or will become eligible within 180 days and LTSS completed.    [] Provider/Patient and/or support system has requested screening.  [] LTSS copy provided to patient or responsible party. [] LTSS unavailable at discharge will send once processed to SNF provider.  [] LTSS  unavailable at discharged mailed to patient  [] LTSS performed by outside agency. No:   [] Private pay and is not financially eligible for Medicaid within the next 180 days. [] Reside out-of-state. [] A residents of a state owned/operated facility that is licensed  by [address] [employer] [facility] or [facility]  [] Enrollment in [facility] hospice services  [] [address] [place] [address]  [x] Patient /Family declines to have screening completed or provide financial information for screening. Patient already is a resident at [facility] and they have the previous LTSS screening. Financial Resources:  Medicaid    [] Initiated and application pending   [x] Full coverage      Advanced Care Plan:  []Surrogate Decision Maker of Care  []POA  [x]Communicated Code Status/ sent DNR (DDNR, POST, Advance Directive)     Other      Raymond .  Oralee Hard MSW  Care Manager  Pager#: [phone]

## 2019-12-21 NOTE — DISCHARGE INSTRUCTIONS
Patient Education     Altered Mental Status: Care Instructions  Your Care Instructions  Altered mental status is a change in how well your brain is working. As a result, you may be confused, be less alert than usual, or act in odd ways. This may include seeing or hearing things that aren't really there (hallucinations). A mental status change has many possible causes. For example, it may be the result of an infection, an imbalance of chemicals in the body, or a chronic disease such as diabetes or COPD. It can also be caused by things such as a head injury, taking certain medicines, or using alcohol or drugs. The doctor may do tests to look for the cause. These tests may include urine tests, blood tests, and imaging tests such as a CT scan. Sometimes a clear cause isn't found. But tests can help the doctor rule out a serious cause of your symptoms. A change in mental status can be scary. But mental status will often return to normal when the cause is treated. So it is important to get any follow-up testing or treatment the doctor has suggested. The doctor has checked you carefully, but problems can develop later. If you notice any problems or new symptoms, get medical treatment right away. Follow-up care is a key part of your treatment and safety. Be sure to make and go to all appointments, and call your doctor if you are having problems. It's also a good idea to know your test results and keep a list of the medicines you take. How can you care for yourself at home? · Be safe with medicines. Take your medicines exactly as prescribed. Call your doctor if you think you are having a problem with your medicine. · Have another adult stay with you until you are better. This can help keep you safe. Ask that person to watch for signs that your mental status is getting worse. When should you call for help? Call 911 anytime you think you may need emergency care.  For example, call if:  · You passed out (lost consciousness). Call your doctor now or seek immediate medical care if:  · Your mental status is getting worse. · You have new symptoms, such as a fever, chills, or shortness of breath. · You do not feel safe. Watch closely for changes in your health, and be sure to contact your doctor if:  · You do not get better as expected. Where can you learn more? Go to [employer]  Enter J452 in the search box to learn more about \"Altered Mental Status: Care Instructions. \"   © 0061-2478 Healthwise, Incorporated. Care instructions adapted under license by [employer] (which disclaims liability or warranty for this information). This care instruction is for use with your licensed healthcare professional. If you have questions about a medical condition or this instruction, always ask your healthcare professional. [address] any warranty or liability for your use of this information.   Content Version: 51.0.256332; Current as of: November 20, 2015

## 2019-12-21 NOTE — PROGRESS NOTES
Bedside and Verbal shift change report given to Tiara (oncoming nurse) by Tan Wilcox (offgoing nurse). Report included the following information SBAR, Kardex, Procedure Summary, MAR and Recent Results.

## 2019-12-21 NOTE — PROGRESS NOTES
Problem: Patient Education: Go to Patient Education Activity  Goal: Patient/Family Education  Outcome: Resolved/Not Met     Problem: Patient Education: Go to Patient Education Activity  Goal: Patient/Family Education  Outcome: Resolved/Not Met     Problem: Patient Education: Go to Patient Education Activity  Goal: Patient/Family Education  Outcome: Resolved/Not Met

## 2019-12-21 NOTE — PROGRESS NOTES
Bedside shift change report given to [facility] RN (oncoming nurse) by Fran Carter RN (offgoing nurse). Report included the following information SBAR, Kardex and MAR.

## 2019-12-23 LAB
BACTERIA SPEC CULT: ABNORMAL
BACTERIA SPEC CULT: ABNORMAL
SERVICE CMNT-IMP: ABNORMAL

## 2019-12-24 ENCOUNTER — PATIENT OUTREACH (OUTPATIENT)
Dept: CASE MANAGEMENT | Age: 84
End: 2019-12-24

## 2019-12-24 LAB
BACTERIA SPEC CULT: NORMAL
BACTERIA SPEC CULT: NORMAL
SERVICE CMNT-IMP: NORMAL
SERVICE CMNT-IMP: NORMAL

## 2019-12-24 NOTE — PROGRESS NOTES
Community Care Team Documentation for Patient in [facility]     Patient discharged from [facility]  to [facility], on 12/21/19. Hospital Discharge diagnosis:       AMS   Lactic Acidosis   CAROL ANN   Dementia   Risk for Falls   Aggressive Behavior      SNF Attending Provider:  Dr. Naren Katz     Anticipated discharge date from SNF:  N/a, pt will transition to LTC. PCP : Celine Holm PA-C    CTN: Uli Maguire RN    [facility] Team rounds completed, updates provided by facility. Brief Summary of Care:    Patient receiving PT/OT. Progressing slowly. Will transition to LTC once therapy complete. Will follow for NEYDA x 30 days. RRAT:  Medium Risk            15       Total Score        3 Has Seen PCP in Last 6 Months (Yes=3, No=0)    2 . Living with Significant Other. Assisted Living. LTAC. SNF. or   Rehab    4 IP Visits Last 12 Months (1-3=4, 4=9, >4=11)    6 Charlson Comorbidity Score (Age + Comorbid Conditions)        Criteria that do not apply:    Patient Length of Stay (>5 days = 3)    Pt.  Coverage (Medicare=5 , Medicaid, or Self-Pay=4)        Active Ambulatory Problems     Diagnosis Date Noted    HTN, goal below 150/90 09/26/2017    Bilateral hearing loss 09/26/2017    Late onset Alzheimer's disease with behavioral disturbance ([address] 09/26/2017    Hyperlipidemia 02/16/2018    TB lung, latent 02/16/2018    Class 1 obesity due to excess calories with serious comorbidity and body mass index (BMI) of 34.0 to 34.9 in adult 04/25/2018    Primary osteoarthritis involving multiple joints 04/25/2018    Post herpetic neuralgia 04/25/2018    Aggressive behavior 12/03/2019    AMS (altered mental status) 12/03/2019    Dementia ([address] 12/07/2019    Risk for falls 12/07/2019    Altered mental status 12/18/2019    Lactic acidosis 12/18/2019    Acute kidney injury ([address] 12/18/2019     Resolved Ambulatory Problems Diagnosis Date Noted    No Resolved Ambulatory Problems     Past Medical History:   Diagnosis Date    Hypercholesterolemia     Hypertension

## 2020-01-03 ENCOUNTER — PATIENT OUTREACH (OUTPATIENT)
Dept: CASE MANAGEMENT | Age: 85
End: 2020-01-03

## 2020-01-08 ENCOUNTER — PATIENT OUTREACH (OUTPATIENT)
Dept: CASE MANAGEMENT | Age: 85
End: 2020-01-08

## 2020-01-08 NOTE — PROGRESS NOTES
Community Care Team Documentation for Patient in [facility]     Patient discharged from [facility]  to [facility], on 12/21/19. Hospital Discharge diagnosis:       AMS   Lactic Acidosis   CAROL ANN   Dementia   Risk for Falls   Aggressive Behavior      SNF Attending Provider:  Dr. Thierno Gómez     Anticipated discharge date from SNF:  N/a, pt will transition to LTC. PCP : Mya Crabtree PA-C    CTN: Leonela Rainey RN    [facility] Team rounds completed, updates provided by facility. Brief Summary of Care:    Patient receiving PT/OT. Progressing slowly. Will transition to LTC once therapy complete. Will follow for NEYDA x 30 days. RRAT:  Medium Risk            15       Total Score        3 Has Seen PCP in Last 6 Months (Yes=3, No=0)    2 . Living with Significant Other. Assisted Living. LTAC. SNF. or   Rehab    4 IP Visits Last 12 Months (1-3=4, 4=9, >4=11)    6 Charlson Comorbidity Score (Age + Comorbid Conditions)        Criteria that do not apply:    Patient Length of Stay (>5 days = 3)    Pt.  Coverage (Medicare=5 , Medicaid, or Self-Pay=4)        Active Ambulatory Problems     Diagnosis Date Noted    HTN, goal below 150/90 09/26/2017    Bilateral hearing loss 09/26/2017    Late onset Alzheimer's disease with behavioral disturbance ([address] 09/26/2017    Hyperlipidemia 02/16/2018    TB lung, latent 02/16/2018    Class 1 obesity due to excess calories with serious comorbidity and body mass index (BMI) of 34.0 to 34.9 in adult 04/25/2018    Primary osteoarthritis involving multiple joints 04/25/2018    Post herpetic neuralgia 04/25/2018    Aggressive behavior 12/03/2019    AMS (altered mental status) 12/03/2019    Dementia ([address] 12/07/2019    Risk for falls 12/07/2019    Altered mental status 12/18/2019    Lactic acidosis 12/18/2019    Acute kidney injury ([address] 12/18/2019     Resolved Ambulatory Problems Diagnosis Date Noted    No Resolved Ambulatory Problems     Past Medical History:   Diagnosis Date    Hypercholesterolemia     Hypertension

## 2020-01-15 ENCOUNTER — PATIENT OUTREACH (OUTPATIENT)
Dept: CASE MANAGEMENT | Age: 85
End: 2020-01-15

## 2020-01-15 NOTE — PROGRESS NOTES
Community Care Team Documentation for Patient in [facility]     Patient discharged from [facility]  to [facility], on 12/21/19. Hospital Discharge diagnosis:       AMS   Lactic Acidosis   CAROL ANN   Dementia   Risk for Falls   Aggressive Behavior      SNF Attending Provider:  Dr. Degroot Fearing     Anticipated discharge date from SNF:  N/a, pt will transition to LTC. PCP : Neida Ch PA-C    CTN: Dorothea Borges RN    [facility] Team rounds completed, updates provided by facility. Brief Summary of Care:    Patient receiving PT/OT. Progressing slowly. Will transition to LTC once therapy complete. Will follow for NEYDA x 30 days. RRAT:  Medium Risk            15       Total Score        3 Has Seen PCP in Last 6 Months (Yes=3, No=0)    2 . Living with Significant Other. Assisted Living. LTAC. SNF. or   Rehab    4 IP Visits Last 12 Months (1-3=4, 4=9, >4=11)    6 Charlson Comorbidity Score (Age + Comorbid Conditions)        Criteria that do not apply:    Patient Length of Stay (>5 days = 3)    Pt.  Coverage (Medicare=5 , Medicaid, or Self-Pay=4)        Active Ambulatory Problems     Diagnosis Date Noted    HTN, goal below 150/90 09/26/2017    Bilateral hearing loss 09/26/2017    Late onset Alzheimer's disease with behavioral disturbance ([address] 09/26/2017    Hyperlipidemia 02/16/2018    TB lung, latent 02/16/2018    Class 1 obesity due to excess calories with serious comorbidity and body mass index (BMI) of 34.0 to 34.9 in adult 04/25/2018    Primary osteoarthritis involving multiple joints 04/25/2018    Post herpetic neuralgia 04/25/2018    Aggressive behavior 12/03/2019    AMS (altered mental status) 12/03/2019    Dementia ([address] 12/07/2019    Risk for falls 12/07/2019    Altered mental status 12/18/2019    Lactic acidosis 12/18/2019    Acute kidney injury ([address] 12/18/2019     Resolved Ambulatory Problems Diagnosis Date Noted    No Resolved Ambulatory Problems     Past Medical History:   Diagnosis Date    Hypercholesterolemia     Hypertension

## 2020-01-22 ENCOUNTER — PATIENT OUTREACH (OUTPATIENT)
Dept: CASE MANAGEMENT | Age: 85
End: 2020-01-22

## 2020-01-22 NOTE — PROGRESS NOTES
Community Care Team Documentation for Patient in [facility]     Patient discharged from [facility]  to [facility], on 12/21/19. Hospital Discharge diagnosis:       AMS   Lactic Acidosis   CAROL ANN   Dementia   Risk for Falls   Aggressive Behavior      SNF Attending Provider:  Dr. Artem Sánchez     Anticipated discharge date from SNF:  N/a, pt will transition to LTC. PCP : Norah Winston PA-C    CTN: Harriet Bojorquez RN    [facility] Team rounds completed, updates provided by facility. Brief Summary of Care:    Patient receiving PT/OT. Progressing slowly. Will transition to LTC once therapy complete. Have followed pt  for NEYDA x 30 days. Patient has now transitioned to LTC. Signing off and episode closed. RRAT:  Medium Risk            15       Total Score        3 Has Seen PCP in Last 6 Months (Yes=3, No=0)    2 . Living with Significant Other. Assisted Living. LTAC. SNF. or   Rehab    4 IP Visits Last 12 Months (1-3=4, 4=9, >4=11)    6 Charlson Comorbidity Score (Age + Comorbid Conditions)        Criteria that do not apply:    Patient Length of Stay (>5 days = 3)    Pt.  Coverage (Medicare=5 , Medicaid, or Self-Pay=4)        Active Ambulatory Problems     Diagnosis Date Noted    HTN, goal below 150/90 09/26/2017    Bilateral hearing loss 09/26/2017    Late onset Alzheimer's disease with behavioral disturbance ([address] 09/26/2017    Hyperlipidemia 02/16/2018    TB lung, latent 02/16/2018    Class 1 obesity due to excess calories with serious comorbidity and body mass index (BMI) of 34.0 to 34.9 in adult 04/25/2018    Primary osteoarthritis involving multiple joints 04/25/2018    Post herpetic neuralgia 04/25/2018    Aggressive behavior 12/03/2019    AMS (altered mental status) 12/03/2019    Dementia ([facility] 12/07/2019    Risk for falls 12/07/2019    Altered mental status 12/18/2019    Lactic acidosis 12/18/2019    Acute kidney injury ([facility] [address] 12/18/2019     Resolved Ambulatory Problems     Diagnosis Date Noted    No Resolved Ambulatory Problems     Past Medical History:   Diagnosis Date    Hypercholesterolemia     Hypertension

## 2020-01-27 ENCOUNTER — HOSPITAL ENCOUNTER (OUTPATIENT)
Dept: LAB | Age: 85
Discharge: HOME | End: 2020-01-27

## 2020-01-27 LAB
ANION GAP SERPL CALC-SCNC: 8 MMOL/L (ref 3–18)
BUN SERPL-MCNC: 13 MG/DL (ref 7–18)
BUN/CREAT SERPL: 10 (ref 12–20)
CALCIUM SERPL-MCNC: 8.6 MG/DL (ref 8.5–10.1)
CHLORIDE SERPL-SCNC: 106 MMOL/L (ref 100–111)
CO2 SERPL-SCNC: 24 MMOL/L (ref 21–32)
CREAT SERPL-MCNC: 1.26 MG/DL (ref 0.6–1.3)
GLUCOSE SERPL-MCNC: 124 MG/DL (ref 74–99)
POTASSIUM SERPL-SCNC: 3.6 MMOL/L (ref 3.5–5.5)
SODIUM SERPL-SCNC: 138 MMOL/L (ref 136–145)

## 2020-01-27 PROCEDURE — 80048 BASIC METABOLIC PNL TOTAL CA: CPT

## 2020-01-27 PROCEDURE — 36415 COLL VENOUS BLD VENIPUNCTURE: CPT

## 2020-06-24 NOTE — PROGRESS NOTES
24-Jun-2020 05:23 Community Care Team Documentation for Patient in [facility]     Patient discharged from [facility]  to [facility], on 12/21/19. Hospital Discharge diagnosis:       AMS   Lactic Acidosis   CAROL ANN   Dementia   Risk for Falls   Aggressive Behavior      SNF Attending Provider:  Dr. Dusty Neves     Anticipated discharge date from SNF:  N/a, pt will transition to LTC. PCP : Gracie Singleton PA-C    CTN: Leo Cary RN    [facility] Team rounds completed, updates provided by facility. Brief Summary of Care:    Patient receiving PT/OT. Progressing slowly. Will transition to LTC once therapy complete. Will follow for NEYDA x 30 days. RRAT:  Medium Risk            15       Total Score        3 Has Seen PCP in Last 6 Months (Yes=3, No=0)    2 . Living with Significant Other. Assisted Living. LTAC. SNF. or   Rehab    4 IP Visits Last 12 Months (1-3=4, 4=9, >4=11)    6 Charlson Comorbidity Score (Age + Comorbid Conditions)        Criteria that do not apply:    Patient Length of Stay (>5 days = 3)    Pt.  Coverage (Medicare=5 , Medicaid, or Self-Pay=4)        Active Ambulatory Problems     Diagnosis Date Noted    HTN, goal below 150/90 09/26/2017    Bilateral hearing loss 09/26/2017    Late onset Alzheimer's disease with behavioral disturbance ([address] 09/26/2017    Hyperlipidemia 02/16/2018    TB lung, latent 02/16/2018    Class 1 obesity due to excess calories with serious comorbidity and body mass index (BMI) of 34.0 to 34.9 in adult 04/25/2018    Primary osteoarthritis involving multiple joints 04/25/2018    Post herpetic neuralgia 04/25/2018    Aggressive behavior 12/03/2019    AMS (altered mental status) 12/03/2019    Dementia ([address] 12/07/2019    Risk for falls 12/07/2019    Altered mental status 12/18/2019    Lactic acidosis 12/18/2019    Acute kidney injury ([address] 12/18/2019     Resolved Ambulatory Problems Diagnosis Date Noted    No Resolved Ambulatory Problems     Past Medical History:   Diagnosis Date    Hypercholesterolemia     Hypertension

## 2025-03-27 NOTE — PATIENT INSTRUCTIONS
Cough: Care Instructions  Your Care Instructions    A cough is your body's response to something that bothers your throat or airways. Many things can cause a cough. You might cough because of a cold or the flu, bronchitis, or asthma. Smoking, postnasal drip, allergies, and stomach acid that backs up into your throat also can cause coughs. A cough is a symptom, not a disease. Most coughs stop when the cause, such as a cold, goes away. You can take a few steps at home to cough less and feel better. Follow-up care is a key part of your treatment and safety. Be sure to make and go to all appointments, and call your doctor if you are having problems. It's also a good idea to know your test results and keep a list of the medicines you take. How can you care for yourself at home? · Drink lots of water and other fluids. This helps thin the mucus and soothes a dry or sore throat. Honey or lemon juice in hot water or tea may ease a dry cough. · Take cough medicine as directed by your doctor. · Prop up your head on pillows to help you breathe and ease a dry cough. · Try cough drops to soothe a dry or sore throat. Cough drops don't stop a cough. Medicine-flavored cough drops are no better than candy-flavored drops or hard candy. · Do not smoke. Avoid secondhand smoke. If you need help quitting, talk to your doctor about stop-smoking programs and medicines. These can increase your chances of quitting for good. When should you call for help? Call 911 anytime you think you may need emergency care.  For example, call if:    · You have severe trouble breathing.    Call your doctor now or seek immediate medical care if:    · You cough up blood.     · You have new or worse trouble breathing.     · You have a new or higher fever.     · You have a new rash.    Watch closely for changes in your health, and be sure to contact your doctor if:    · You cough more deeply or more often, especially if you notice more mucus or a What Type Of Note Output Would You Prefer (Optional)?: Standard Output Hpi Title: Evaluation of Skin Lesions change in the color of your mucus.     · You have new symptoms, such as a sore throat, an earache, or sinus pain.     · You do not get better as expected. Where can you learn more? Go to http://pilo-shivani.info/. Enter D279 in the search box to learn more about \"Cough: Care Instructions. \"  Current as of: September 5, 2018  Content Version: 11.9  © 9518-8587 [employer]. Care instructions adapted under license by [employer] (which disclaims liability or warranty for this information). If you have questions about a medical condition or this instruction, always ask your healthcare professional. [address] any warranty or liability for your use of this information. How Severe Are Your Spot(S)?: mild Have Your Spot(S) Been Treated In The Past?: has not been treated